# Patient Record
Sex: MALE | Race: WHITE | NOT HISPANIC OR LATINO | Employment: FULL TIME | URBAN - METROPOLITAN AREA
[De-identification: names, ages, dates, MRNs, and addresses within clinical notes are randomized per-mention and may not be internally consistent; named-entity substitution may affect disease eponyms.]

---

## 2019-08-20 ENCOUNTER — OFFICE VISIT (OUTPATIENT)
Dept: FAMILY MEDICINE CLINIC | Facility: CLINIC | Age: 39
End: 2019-08-20
Payer: COMMERCIAL

## 2019-08-20 VITALS
SYSTOLIC BLOOD PRESSURE: 118 MMHG | WEIGHT: 202 LBS | HEIGHT: 70 IN | RESPIRATION RATE: 16 BRPM | OXYGEN SATURATION: 97 % | TEMPERATURE: 98.1 F | DIASTOLIC BLOOD PRESSURE: 70 MMHG | HEART RATE: 65 BPM | BODY MASS INDEX: 28.92 KG/M2

## 2019-08-20 DIAGNOSIS — Z00.00 PHYSICAL EXAM, ANNUAL: Primary | ICD-10-CM

## 2019-08-20 DIAGNOSIS — Z13.220 SCREENING FOR LIPOID DISORDERS: ICD-10-CM

## 2019-08-20 DIAGNOSIS — Z83.71 FAMILY HISTORY OF POLYPS IN THE COLON: ICD-10-CM

## 2019-08-20 DIAGNOSIS — F41.1 GAD (GENERALIZED ANXIETY DISORDER): ICD-10-CM

## 2019-08-20 PROCEDURE — 99385 PREV VISIT NEW AGE 18-39: CPT | Performed by: FAMILY MEDICINE

## 2019-08-20 RX ORDER — DOXYCYCLINE HYCLATE 150 MG/1
TABLET ORAL
COMMUNITY
Start: 2019-07-19 | End: 2020-03-20 | Stop reason: SDUPTHER

## 2019-08-20 RX ORDER — IVERMECTIN 10 MG/G
1 CREAM TOPICAL DAILY PRN
COMMUNITY
End: 2022-05-09

## 2019-08-20 RX ORDER — FLUOXETINE 10 MG/1
TABLET, FILM COATED ORAL
Qty: 60 TABLET | Refills: 3 | Status: SHIPPED | OUTPATIENT
Start: 2019-08-20 | End: 2019-10-04

## 2019-08-20 NOTE — PROGRESS NOTES
Assessment/Plan:    No problem-specific Assessment & Plan notes found for this encounter  Diagnoses and all orders for this visit:    Physical exam, annual  Comments:  work on diet/exercise  labs as ordered  otherwise up to date preventively    LUCRETIA (generalized anxiety disorder)  Comments:  discussed tx options  will start prozac 10mg and uptitrate to 20mg daily  f/u 6 weeks  Orders:  -     Comprehensive metabolic panel; Future  -     CBC and differential; Future  -     TSH, 3rd generation; Future  -     FLUoxetine (PROzac) 10 MG tablet; 1 tab daily x 2 weeks, then increase to 2 tabs daily    Family history of polyps in the colon  -     Ambulatory referral to Colorectal Surgery; Future    Screening for lipoid disorders  -     Lipid panel; Future    Other orders  -     Doxycycline Hyclate 150 MG TABS  -     Ivermectin (SOOLANTRA) 1 % CREA; Apply topically        Subjective:      Patient ID: Marifer Shipman is a 44 y o  male  HPI  Pt presents as new pt  Due for cholesterol testing  Up to date on tdap  Hasn't been exercising   +family hx of colon polyps in his brother <40  Pt has never had colonoscopy  Pt and wife are concerned about anxiety  +difficulty with calming down  +worry  +irritability  No depression  No si/hi  Has been anxious since childhood but never had a work-up or treatment  No impulsivity  +family hx of anxiety  No periods of kavya/hypomania  The following portions of the patient's history were reviewed and updated as appropriate: allergies, current medications, past family history, past medical history, past social history, past surgical history and problem list     Review of Systems   Constitutional: Negative for chills, fatigue, fever and unexpected weight change  HENT: Negative for congestion, ear pain, hearing loss, postnasal drip, rhinorrhea, sinus pressure, sinus pain, sore throat, trouble swallowing and voice change      Eyes: Negative for pain, redness and visual disturbance  Respiratory: Negative for cough and shortness of breath  Cardiovascular: Negative for chest pain, palpitations and leg swelling  Gastrointestinal: Negative for abdominal pain, constipation, diarrhea and nausea  Endocrine: Negative for cold intolerance, heat intolerance, polydipsia and polyuria  Genitourinary: Negative for dysuria, frequency and urgency  Musculoskeletal: Negative for arthralgias, joint swelling and myalgias  Skin: Negative for rash  No suspicious lesions   Neurological: Negative for weakness, numbness and headaches  Hematological: Negative for adenopathy  Objective:      /70   Pulse 65   Temp 98 1 °F (36 7 °C)   Resp 16   Ht 5' 9 57" (1 767 m)   Wt 91 6 kg (202 lb)   SpO2 97%   BMI 29 35 kg/m²          Physical Exam   Constitutional: He is oriented to person, place, and time  He appears well-developed and well-nourished  No distress  HENT:   Head: Normocephalic and atraumatic  Right Ear: Tympanic membrane, external ear and ear canal normal    Left Ear: Tympanic membrane, external ear and ear canal normal    Nose: Nose normal    Mouth/Throat: Oropharynx is clear and moist and mucous membranes are normal  No oropharyngeal exudate  Eyes: Pupils are equal, round, and reactive to light  Conjunctivae and EOM are normal    Neck: No JVD present  Carotid bruit is not present  No thyromegaly present  Cardiovascular: Regular rhythm, S1 normal and S2 normal  Exam reveals no gallop, no S3, no S4 and no friction rub  No murmur heard  Pulmonary/Chest: Effort normal and breath sounds normal  He has no wheezes  He has no rhonchi  He has no rales  Abdominal: Soft  Bowel sounds are normal  He exhibits no distension  There is no tenderness  Lymphadenopathy:     He has no cervical adenopathy  Neurological: He is alert and oriented to person, place, and time  He has normal strength and normal reflexes  No cranial nerve deficit or sensory deficit  Psychiatric: His behavior is normal  Judgment and thought content normal  His mood appears anxious  His speech is rapid and/or pressured  He is not actively hallucinating  Cognition and memory are normal  He is attentive  BMI Counseling: Body mass index is 29 35 kg/m²  Discussed the patient's BMI with him  The BMI is above average  BMI counseling and education was provided to the patient  Exercise recommendations include moderate aerobic physical activity for 150 minutes/week

## 2019-09-20 ENCOUNTER — APPOINTMENT (OUTPATIENT)
Dept: LAB | Facility: CLINIC | Age: 39
End: 2019-09-20
Payer: COMMERCIAL

## 2019-09-20 ENCOUNTER — TRANSCRIBE ORDERS (OUTPATIENT)
Dept: LAB | Facility: CLINIC | Age: 39
End: 2019-09-20

## 2019-09-20 DIAGNOSIS — F41.1 GAD (GENERALIZED ANXIETY DISORDER): ICD-10-CM

## 2019-09-20 DIAGNOSIS — Z13.220 SCREENING FOR LIPOID DISORDERS: ICD-10-CM

## 2019-09-20 LAB
ALBUMIN SERPL BCP-MCNC: 4.1 G/DL (ref 3.5–5)
ALP SERPL-CCNC: 57 U/L (ref 46–116)
ALT SERPL W P-5'-P-CCNC: 27 U/L (ref 12–78)
ANION GAP SERPL CALCULATED.3IONS-SCNC: 6 MMOL/L (ref 4–13)
AST SERPL W P-5'-P-CCNC: 18 U/L (ref 5–45)
BASOPHILS # BLD AUTO: 0.04 THOUSANDS/ΜL (ref 0–0.1)
BASOPHILS NFR BLD AUTO: 1 % (ref 0–1)
BILIRUB SERPL-MCNC: 0.4 MG/DL (ref 0.2–1)
BUN SERPL-MCNC: 6 MG/DL (ref 5–25)
CALCIUM SERPL-MCNC: 8.8 MG/DL (ref 8.3–10.1)
CHLORIDE SERPL-SCNC: 98 MMOL/L (ref 100–108)
CHOLEST SERPL-MCNC: 229 MG/DL (ref 50–200)
CO2 SERPL-SCNC: 29 MMOL/L (ref 21–32)
CREAT SERPL-MCNC: 0.8 MG/DL (ref 0.6–1.3)
EOSINOPHIL # BLD AUTO: 0.12 THOUSAND/ΜL (ref 0–0.61)
EOSINOPHIL NFR BLD AUTO: 2 % (ref 0–6)
ERYTHROCYTE [DISTWIDTH] IN BLOOD BY AUTOMATED COUNT: 12.1 % (ref 11.6–15.1)
GFR SERPL CREATININE-BSD FRML MDRD: 113 ML/MIN/1.73SQ M
GLUCOSE P FAST SERPL-MCNC: 99 MG/DL (ref 65–99)
HCT VFR BLD AUTO: 47.1 % (ref 36.5–49.3)
HDLC SERPL-MCNC: 58 MG/DL (ref 40–60)
HGB BLD-MCNC: 16.2 G/DL (ref 12–17)
IMM GRANULOCYTES # BLD AUTO: 0.02 THOUSAND/UL (ref 0–0.2)
IMM GRANULOCYTES NFR BLD AUTO: 0 % (ref 0–2)
LDLC SERPL CALC-MCNC: 155 MG/DL (ref 0–100)
LYMPHOCYTES # BLD AUTO: 1.03 THOUSANDS/ΜL (ref 0.6–4.47)
LYMPHOCYTES NFR BLD AUTO: 20 % (ref 14–44)
MCH RBC QN AUTO: 31.5 PG (ref 26.8–34.3)
MCHC RBC AUTO-ENTMCNC: 34.4 G/DL (ref 31.4–37.4)
MCV RBC AUTO: 92 FL (ref 82–98)
MONOCYTES # BLD AUTO: 0.65 THOUSAND/ΜL (ref 0.17–1.22)
MONOCYTES NFR BLD AUTO: 13 % (ref 4–12)
NEUTROPHILS # BLD AUTO: 3.3 THOUSANDS/ΜL (ref 1.85–7.62)
NEUTS SEG NFR BLD AUTO: 64 % (ref 43–75)
NONHDLC SERPL-MCNC: 171 MG/DL
NRBC BLD AUTO-RTO: 0 /100 WBCS
PLATELET # BLD AUTO: 241 THOUSANDS/UL (ref 149–390)
PMV BLD AUTO: 8.7 FL (ref 8.9–12.7)
POTASSIUM SERPL-SCNC: 4.2 MMOL/L (ref 3.5–5.3)
PROT SERPL-MCNC: 7.7 G/DL (ref 6.4–8.2)
RBC # BLD AUTO: 5.15 MILLION/UL (ref 3.88–5.62)
SODIUM SERPL-SCNC: 133 MMOL/L (ref 136–145)
TRIGL SERPL-MCNC: 78 MG/DL
TSH SERPL DL<=0.05 MIU/L-ACNC: 1.62 UIU/ML (ref 0.36–3.74)
WBC # BLD AUTO: 5.16 THOUSAND/UL (ref 4.31–10.16)

## 2019-09-20 PROCEDURE — 80053 COMPREHEN METABOLIC PANEL: CPT

## 2019-09-20 PROCEDURE — 80061 LIPID PANEL: CPT

## 2019-09-20 PROCEDURE — 36415 COLL VENOUS BLD VENIPUNCTURE: CPT

## 2019-09-20 PROCEDURE — 84443 ASSAY THYROID STIM HORMONE: CPT

## 2019-09-20 PROCEDURE — 85025 COMPLETE CBC W/AUTO DIFF WBC: CPT

## 2019-10-04 ENCOUNTER — OFFICE VISIT (OUTPATIENT)
Dept: FAMILY MEDICINE CLINIC | Facility: CLINIC | Age: 39
End: 2019-10-04
Payer: COMMERCIAL

## 2019-10-04 ENCOUNTER — APPOINTMENT (OUTPATIENT)
Dept: LAB | Facility: CLINIC | Age: 39
End: 2019-10-04
Payer: COMMERCIAL

## 2019-10-04 VITALS
WEIGHT: 201 LBS | HEIGHT: 70 IN | RESPIRATION RATE: 16 BRPM | OXYGEN SATURATION: 98 % | HEART RATE: 75 BPM | TEMPERATURE: 98.4 F | SYSTOLIC BLOOD PRESSURE: 120 MMHG | DIASTOLIC BLOOD PRESSURE: 78 MMHG | BODY MASS INDEX: 28.77 KG/M2

## 2019-10-04 DIAGNOSIS — E87.1 HYPONATREMIA: ICD-10-CM

## 2019-10-04 DIAGNOSIS — F41.1 GENERALIZED ANXIETY DISORDER: Primary | ICD-10-CM

## 2019-10-04 DIAGNOSIS — R06.83 SNORING: ICD-10-CM

## 2019-10-04 DIAGNOSIS — Z23 FLU VACCINE NEED: ICD-10-CM

## 2019-10-04 DIAGNOSIS — E78.2 MIXED HYPERLIPIDEMIA: ICD-10-CM

## 2019-10-04 DIAGNOSIS — E87.1 HYPONATREMIA: Primary | ICD-10-CM

## 2019-10-04 LAB
ANION GAP SERPL CALCULATED.3IONS-SCNC: 8 MMOL/L (ref 4–13)
BUN SERPL-MCNC: 6 MG/DL (ref 5–25)
CALCIUM SERPL-MCNC: 9 MG/DL (ref 8.3–10.1)
CHLORIDE SERPL-SCNC: 95 MMOL/L (ref 100–108)
CO2 SERPL-SCNC: 28 MMOL/L (ref 21–32)
CREAT SERPL-MCNC: 0.82 MG/DL (ref 0.6–1.3)
GFR SERPL CREATININE-BSD FRML MDRD: 111 ML/MIN/1.73SQ M
GLUCOSE SERPL-MCNC: 94 MG/DL (ref 65–140)
POTASSIUM SERPL-SCNC: 4.1 MMOL/L (ref 3.5–5.3)
SODIUM SERPL-SCNC: 131 MMOL/L (ref 136–145)

## 2019-10-04 PROCEDURE — 90471 IMMUNIZATION ADMIN: CPT

## 2019-10-04 PROCEDURE — 90686 IIV4 VACC NO PRSV 0.5 ML IM: CPT

## 2019-10-04 PROCEDURE — 80048 BASIC METABOLIC PNL TOTAL CA: CPT

## 2019-10-04 PROCEDURE — 99214 OFFICE O/P EST MOD 30 MIN: CPT | Performed by: FAMILY MEDICINE

## 2019-10-04 PROCEDURE — 36415 COLL VENOUS BLD VENIPUNCTURE: CPT

## 2019-10-04 RX ORDER — ESCITALOPRAM OXALATE 10 MG/1
10 TABLET ORAL DAILY
Qty: 30 TABLET | Refills: 3 | Status: SHIPPED | OUTPATIENT
Start: 2019-10-04 | End: 2019-12-22 | Stop reason: SDUPTHER

## 2019-10-04 NOTE — PROGRESS NOTES
Assessment/Plan:    No problem-specific Assessment & Plan notes found for this encounter  Diagnoses and all orders for this visit:    Generalized anxiety disorder  Comments:  having more sleepiness with prozac, though it has been helpful for anxiety  will d/c prozac and start lexapro 10mg daily  f/u 1 mo  Orders:  -     escitalopram (LEXAPRO) 10 mg tablet; Take 1 tablet (10 mg total) by mouth daily    Snoring  Comments:  check home sleep study to r/o DUTCH  Orders:  -     Home Study; Future    Hyponatremia  Comments:  meds vs lab error vs hydration vs other  recheck  Orders:  -     Basic metabolic panel; Future    Mixed hyperlipidemia  Comments:  10 yr cv risk 1%  discussed need for regular exercise and weight loss      Flu vaccine need  -     influenza vaccine, 3138-6118, quadrivalent, 0 5 mL, preservative-free, for adult and pediatric patients 6 mos+ (AFLURIA, FLUARIX, FLULAVAL, FLUZONE)        Subjective:      Patient ID: Tawnya Roldan is a 44 y o  male  HPI  Pt presents in routine f/u of labs and mood  Pt states he has been markedly less irritable and stressed on the prozac  He still has times where the worry and irritability sneak through, but it is less than before  Denies depression, sadness, hopelessness  He has noted, however, that he is extremely tired on the prozac which he takes at night  Worsened at the increase from 10 to 20mg  No si/hi  Can divert his worry and attention easier than before med  Wife has noticed apneic episodes and snoring from   He feels tired daily  Not falling asleep driving or with work      Most recent labs show:  Lab Results   Component Value Date    SODIUM 131 (L) 10/04/2019    K 4 1 10/04/2019    CL 95 (L) 10/04/2019    CO2 28 10/04/2019    AGAP 8 10/04/2019    BUN 6 10/04/2019    CREATININE 0 82 10/04/2019    GLUC 94 10/04/2019    GLUF 99 09/20/2019    CALCIUM 9 0 10/04/2019    AST 18 09/20/2019    ALT 27 09/20/2019    ALKPHOS 57 09/20/2019    TP 7 7 09/20/2019    TBILI 0 40 09/20/2019    EGFR 111 10/04/2019     Lab Results   Component Value Date    CHOLESTEROL 229 (H) 09/20/2019     Lab Results   Component Value Date    HDL 58 09/20/2019     Lab Results   Component Value Date    TRIG 78 09/20/2019     Lab Results   Component Value Date    [place] 171 09/20/2019     Lab Results   Component Value Date    WBC 5 16 09/20/2019    HGB 16 2 09/20/2019    HCT 47 1 09/20/2019    MCV 92 09/20/2019     09/20/2019     Lab Results   Component Value Date    UKX9BXVAMKZT 1 621 09/20/2019       Med/surg/fam hx and meds reviewed  Review of Systems   Constitutional: Positive for fatigue  Negative for chills, fever and unexpected weight change  HENT: Negative for congestion, ear pain, hearing loss, postnasal drip, rhinorrhea, sinus pressure, sinus pain, sore throat, trouble swallowing and voice change  Eyes: Negative for pain, redness and visual disturbance  Respiratory: Negative for cough and shortness of breath  Cardiovascular: Negative for chest pain, palpitations and leg swelling  Gastrointestinal: Negative for abdominal pain, constipation, diarrhea and nausea  Endocrine: Negative for cold intolerance, heat intolerance, polydipsia and polyuria  Genitourinary: Negative for dysuria, frequency and urgency  Musculoskeletal: Negative for arthralgias, joint swelling and myalgias  Skin: Negative for rash  No suspicious lesions   Neurological: Negative for weakness, numbness and headaches  Hematological: Negative for adenopathy  Psychiatric/Behavioral: Negative for dysphoric mood and suicidal ideas  The patient is nervous/anxious  Objective:      /78   Pulse 75   Temp 98 4 °F (36 9 °C)   Resp 16   Ht 5' 9 57" (1 767 m)   Wt 91 2 kg (201 lb)   SpO2 98%   BMI 29 20 kg/m²          Physical Exam   Constitutional: He is oriented to person, place, and time  He appears well-developed and well-nourished  No distress     HENT: Head: Normocephalic and atraumatic  Right Ear: Tympanic membrane, external ear and ear canal normal    Left Ear: Tympanic membrane, external ear and ear canal normal    Nose: Nose normal    Mouth/Throat: Oropharynx is clear and moist and mucous membranes are normal  No oropharyngeal exudate  Eyes: Pupils are equal, round, and reactive to light  Conjunctivae and EOM are normal    Neck: No JVD present  Carotid bruit is not present  No thyromegaly present  Cardiovascular: Regular rhythm, S1 normal and S2 normal  Exam reveals no gallop, no S3, no S4 and no friction rub  No murmur heard  Pulmonary/Chest: Effort normal and breath sounds normal  He has no wheezes  He has no rhonchi  He has no rales  Abdominal: Soft  Bowel sounds are normal  He exhibits no distension  There is no tenderness  Lymphadenopathy:     He has no cervical adenopathy  Neurological: He is alert and oriented to person, place, and time  He has normal strength and normal reflexes  No cranial nerve deficit or sensory deficit  Psychiatric: He has a normal mood and affect   His behavior is normal  Judgment and thought content normal

## 2019-10-09 PROBLEM — Z83.719 FAMILY HISTORY OF COLONIC POLYPS: Status: ACTIVE | Noted: 2019-10-09

## 2019-10-09 PROBLEM — Z83.71 FAMILY HISTORY OF COLONIC POLYPS: Status: ACTIVE | Noted: 2019-10-09

## 2019-10-17 ENCOUNTER — TELEPHONE (OUTPATIENT)
Dept: SLEEP CENTER | Facility: CLINIC | Age: 39
End: 2019-10-17

## 2019-10-17 NOTE — TELEPHONE ENCOUNTER
----- Message from Bradley Daniels MD sent at 10/16/2019  8:45 PM EDT -----  approved  ----- Message -----  From: Robson Alvarado  Sent: 10/16/2019   8:45 AM EDT  To: Sleep Medicine Brittany Lomeli, #    PLEASE REVIEW FOR APPROVAL OR DENIAL AND WHY

## 2019-10-24 ENCOUNTER — APPOINTMENT (OUTPATIENT)
Dept: LAB | Facility: CLINIC | Age: 39
End: 2019-10-24
Payer: COMMERCIAL

## 2019-10-24 DIAGNOSIS — E87.1 HYPONATREMIA: ICD-10-CM

## 2019-10-24 LAB
ANION GAP SERPL CALCULATED.3IONS-SCNC: 8 MMOL/L (ref 4–13)
BUN SERPL-MCNC: 9 MG/DL (ref 5–25)
CALCIUM SERPL-MCNC: 8.8 MG/DL (ref 8.3–10.1)
CHLORIDE SERPL-SCNC: 98 MMOL/L (ref 100–108)
CO2 SERPL-SCNC: 26 MMOL/L (ref 21–32)
CREAT SERPL-MCNC: 0.73 MG/DL (ref 0.6–1.3)
GFR SERPL CREATININE-BSD FRML MDRD: 117 ML/MIN/1.73SQ M
GLUCOSE SERPL-MCNC: 77 MG/DL (ref 65–140)
POTASSIUM SERPL-SCNC: 4.6 MMOL/L (ref 3.5–5.3)
SODIUM SERPL-SCNC: 132 MMOL/L (ref 136–145)

## 2019-10-24 PROCEDURE — 36415 COLL VENOUS BLD VENIPUNCTURE: CPT

## 2019-10-24 PROCEDURE — 80048 BASIC METABOLIC PNL TOTAL CA: CPT

## 2019-10-28 ENCOUNTER — APPOINTMENT (OUTPATIENT)
Dept: LAB | Facility: CLINIC | Age: 39
End: 2019-10-28
Payer: COMMERCIAL

## 2019-10-28 ENCOUNTER — TRANSCRIBE ORDERS (OUTPATIENT)
Dept: LAB | Facility: CLINIC | Age: 39
End: 2019-10-28

## 2019-10-28 DIAGNOSIS — E87.1 HYPONATREMIA: ICD-10-CM

## 2019-10-28 LAB — OSMOLALITY UR: 555 MMOL/KG

## 2019-10-28 PROCEDURE — 83935 ASSAY OF URINE OSMOLALITY: CPT | Performed by: FAMILY MEDICINE

## 2019-10-28 RX ORDER — FLUOXETINE 10 MG/1
10 TABLET, FILM COATED ORAL DAILY
COMMUNITY
Start: 2019-10-24 | End: 2019-11-01

## 2019-11-01 ENCOUNTER — OFFICE VISIT (OUTPATIENT)
Dept: FAMILY MEDICINE CLINIC | Facility: CLINIC | Age: 39
End: 2019-11-01
Payer: COMMERCIAL

## 2019-11-01 VITALS
WEIGHT: 199.8 LBS | DIASTOLIC BLOOD PRESSURE: 72 MMHG | SYSTOLIC BLOOD PRESSURE: 114 MMHG | RESPIRATION RATE: 16 BRPM | TEMPERATURE: 97.5 F | HEIGHT: 69 IN | BODY MASS INDEX: 29.59 KG/M2 | OXYGEN SATURATION: 98 % | HEART RATE: 67 BPM

## 2019-11-01 DIAGNOSIS — E22.2 SIADH (SYNDROME OF INAPPROPRIATE ADH PRODUCTION) (HCC): ICD-10-CM

## 2019-11-01 DIAGNOSIS — F41.1 GENERALIZED ANXIETY DISORDER: Primary | ICD-10-CM

## 2019-11-01 PROCEDURE — 99214 OFFICE O/P EST MOD 30 MIN: CPT | Performed by: FAMILY MEDICINE

## 2019-11-01 PROCEDURE — 3008F BODY MASS INDEX DOCD: CPT | Performed by: FAMILY MEDICINE

## 2019-11-01 RX ORDER — BUPROPION HYDROCHLORIDE 150 MG/1
150 TABLET ORAL EVERY MORNING
Qty: 30 TABLET | Refills: 1 | Status: SHIPPED | OUTPATIENT
Start: 2019-11-01 | End: 2019-12-22 | Stop reason: SDUPTHER

## 2019-11-01 NOTE — PROGRESS NOTES
Assessment/Plan:    No problem-specific Assessment & Plan notes found for this encounter  Diagnoses and all orders for this visit:    Generalized anxiety disorder  Comments:  anxiety is gone, but he has a lot of irritability  we discussed increasing lexapro, but i am hesitant due to the siadh  we discussed adjuncts of wellbutrin, buspar, and low-dose atypical antipsychotics  For now, we will add wellbutrin XL 150mg and watch irritability symptoms--if these increase, will d/c the wellbutrin and make another plan    Orders:  -     buPROPion (WELLBUTRIN XL) 150 mg 24 hr tablet; Take 1 tablet (150 mg total) by mouth every morning  -     Basic metabolic panel; Future    SIADH (syndrome of inappropriate ADH production) ([facility])  Comments:  urine osms/exam/labs c/w siadh as cause of hyponatremia  this is likely due to ssri's, but these have been extremely helpful to him, so I am loathe to stop them  Will continue lexapro as hyponatremia is mild  Adding wellbutrin  Check bmp in 2 weeks      Other orders  -     Discontinue: FLUoxetine (PROzac) 10 MG tablet; Take 10 mg by mouth daily        Subjective:      Patient ID: Elda Zheng is a 44 y o  male  HPI    Pt presents in routine f/u for anxiety  He is far less anxious  Notices less worry  Sleeping well  His only issue is irritability which seems excessive to him and his wife  He isn't sedated as he was on prozac  No si/hi  Feels stressed but doesn't feel sad or hopeless  Trying to sleep and eat regularly  The following portions of the patient's history were reviewed and updated as appropriate: allergies, current medications, past family history, past medical history, past social history, past surgical history and problem list     Review of Systems   Constitutional: Negative for chills, fatigue, fever and unexpected weight change     HENT: Negative for congestion, ear pain, hearing loss, postnasal drip, rhinorrhea, sinus pressure, sinus pain, sore throat, trouble swallowing and voice change  Eyes: Negative for pain, redness and visual disturbance  Respiratory: Negative for cough and shortness of breath  Cardiovascular: Negative for chest pain, palpitations and leg swelling  Gastrointestinal: Negative for abdominal pain, constipation, diarrhea and nausea  Endocrine: Negative for cold intolerance, heat intolerance, polydipsia and polyuria  Genitourinary: Negative for dysuria, frequency and urgency  Musculoskeletal: Negative for arthralgias, joint swelling and myalgias  Skin: Negative for rash  No suspicious lesions   Neurological: Negative for weakness, numbness and headaches  Hematological: Negative for adenopathy  Psychiatric/Behavioral: Negative for suicidal ideas  The patient is nervous/anxious  Objective:      /72   Pulse 67   Temp 97 5 °F (36 4 °C)   Resp 16   Ht 5' 9" (1 753 m)   Wt 90 6 kg (199 lb 12 8 oz)   SpO2 98%   BMI 29 51 kg/m²          Physical Exam   Constitutional: He is oriented to person, place, and time  He appears well-developed and well-nourished  No distress  HENT:   Head: Normocephalic and atraumatic  Right Ear: Tympanic membrane, external ear and ear canal normal    Left Ear: Tympanic membrane, external ear and ear canal normal    Nose: Mucosal edema present  Mouth/Throat: Mucous membranes are normal  Posterior oropharyngeal edema and posterior oropharyngeal erythema present  No oropharyngeal exudate  Eyes: Pupils are equal, round, and reactive to light  Conjunctivae and EOM are normal    Neck: No JVD present  Carotid bruit is not present  No thyromegaly present  Cardiovascular: Regular rhythm, S1 normal and S2 normal  Exam reveals no gallop, no S3, no S4 and no friction rub  No murmur heard  Pulmonary/Chest: Effort normal and breath sounds normal  He has no wheezes  He has no rhonchi  He has no rales  Abdominal: Soft  Bowel sounds are normal  He exhibits no distension   There is no tenderness  Lymphadenopathy:     He has no cervical adenopathy  Neurological: He is alert and oriented to person, place, and time  He has normal strength and normal reflexes  No cranial nerve deficit or sensory deficit  Psychiatric: He has a normal mood and affect  His behavior is normal  Judgment and thought content normal  His speech is rapid and/or pressured  He is not actively hallucinating  Cognition and memory are normal  He is attentive  BMI Counseling: Body mass index is 29 51 kg/m²  The BMI is above normal  Nutrition recommendations include reducing portion sizes  Exercise recommendations include exercising 3-5 times per week

## 2019-11-18 ENCOUNTER — APPOINTMENT (OUTPATIENT)
Dept: LAB | Facility: CLINIC | Age: 39
End: 2019-11-18
Payer: COMMERCIAL

## 2019-11-18 DIAGNOSIS — F41.1 GENERALIZED ANXIETY DISORDER: ICD-10-CM

## 2019-11-18 LAB
ANION GAP SERPL CALCULATED.3IONS-SCNC: 11 MMOL/L (ref 4–13)
BUN SERPL-MCNC: 10 MG/DL (ref 5–25)
CALCIUM SERPL-MCNC: 8.5 MG/DL (ref 8.3–10.1)
CHLORIDE SERPL-SCNC: 101 MMOL/L (ref 100–108)
CO2 SERPL-SCNC: 26 MMOL/L (ref 21–32)
CREAT SERPL-MCNC: 0.7 MG/DL (ref 0.6–1.3)
GFR SERPL CREATININE-BSD FRML MDRD: 119 ML/MIN/1.73SQ M
GLUCOSE SERPL-MCNC: 94 MG/DL (ref 65–140)
POTASSIUM SERPL-SCNC: 4.1 MMOL/L (ref 3.5–5.3)
SODIUM SERPL-SCNC: 138 MMOL/L (ref 136–145)

## 2019-11-18 PROCEDURE — 80048 BASIC METABOLIC PNL TOTAL CA: CPT

## 2019-11-18 PROCEDURE — 36415 COLL VENOUS BLD VENIPUNCTURE: CPT

## 2019-11-20 ENCOUNTER — HOSPITAL ENCOUNTER (OUTPATIENT)
Dept: SLEEP CENTER | Facility: CLINIC | Age: 39
Discharge: HOME | End: 2019-11-20
Payer: COMMERCIAL

## 2019-11-20 DIAGNOSIS — R06.83 SNORING: ICD-10-CM

## 2019-11-20 PROCEDURE — G0399 HOME SLEEP TEST/TYPE 3 PORTA: HCPCS

## 2019-11-20 PROCEDURE — G0399 HOME SLEEP TEST/TYPE 3 PORTA: HCPCS | Performed by: PSYCHIATRY & NEUROLOGY

## 2019-11-21 NOTE — PROGRESS NOTES
Home Sleep Study Documentation    Pre-Sleep Home Study:    Set-up and instructions performed by: Dexter Beckford    Technician performed demonstration for Patient: yes    Return demonstration performed by Patient: yes    Written instructions provided to Patient: yes    Patient signed consent form: yes        Post-Sleep Home Study:    Additional comments by Patient: none    Home Sleep Study Failed:no:    Failure reason: N/A    Reported or Detected: N/A    Scored by: Yamilka Watts

## 2019-11-26 ENCOUNTER — TELEPHONE (OUTPATIENT)
Dept: SLEEP CENTER | Facility: CLINIC | Age: 39
End: 2019-11-26

## 2019-11-26 NOTE — TELEPHONE ENCOUNTER
I spoke with patient, advised above  Patient states he worked for [employer] and does not think he would be able to use CPAP  Scheduled consult with Dr Zachary Marcelo to review other treatment options  1/16/20 at 1100 in [place]

## 2019-11-26 NOTE — TELEPHONE ENCOUNTER
----- Message from Hector Baker MD sent at 11/25/2019  4:36 PM EST -----  HST read  Either APAP or CPAP titration recommended

## 2019-11-29 ENCOUNTER — TELEPHONE (OUTPATIENT)
Dept: SLEEP CENTER | Facility: CLINIC | Age: 39
End: 2019-11-29

## 2019-11-29 NOTE — TELEPHONE ENCOUNTER
Returned the [employer] office call  They wanted to know how to order CPAP study  Patient has not had consult with Dr Kyra Zavala yet it is scheduled for 1/16/2020

## 2019-12-06 ENCOUNTER — OFFICE VISIT (OUTPATIENT)
Dept: FAMILY MEDICINE CLINIC | Facility: CLINIC | Age: 39
End: 2019-12-06
Payer: COMMERCIAL

## 2019-12-06 VITALS
SYSTOLIC BLOOD PRESSURE: 116 MMHG | HEIGHT: 69 IN | DIASTOLIC BLOOD PRESSURE: 68 MMHG | RESPIRATION RATE: 12 BRPM | OXYGEN SATURATION: 98 % | HEART RATE: 99 BPM | TEMPERATURE: 97.5 F | WEIGHT: 204.8 LBS | BODY MASS INDEX: 30.33 KG/M2

## 2019-12-06 DIAGNOSIS — F41.1 GENERALIZED ANXIETY DISORDER: Primary | ICD-10-CM

## 2019-12-06 DIAGNOSIS — G47.33 OSA (OBSTRUCTIVE SLEEP APNEA): ICD-10-CM

## 2019-12-06 DIAGNOSIS — E22.2 SIADH (SYNDROME OF INAPPROPRIATE ADH PRODUCTION) (HCC): ICD-10-CM

## 2019-12-06 PROCEDURE — 3008F BODY MASS INDEX DOCD: CPT | Performed by: FAMILY MEDICINE

## 2019-12-06 PROCEDURE — 1036F TOBACCO NON-USER: CPT | Performed by: FAMILY MEDICINE

## 2019-12-06 PROCEDURE — 99213 OFFICE O/P EST LOW 20 MIN: CPT | Performed by: FAMILY MEDICINE

## 2019-12-06 NOTE — PROGRESS NOTES
Assessment/Plan:    No problem-specific Assessment & Plan notes found for this encounter  Diagnoses and all orders for this visit:    Generalized anxiety disorder  Comments:  improved on current meds  continue same  re-eval once his sleep is sorted with cpap  f/u 2 mo after cpap start    DUTCH (obstructive sleep apnea)  Comments:  f/u with sleep doc  severe sleep apnea--suspect he might do better with a titration than with autopap, but will defer to sleep med    SIADH (syndrome of inappropriate ADH production) ([facility] [address] )  Comments:  last sodium nml  continue current meds  likely due to ssri        Subjective:      Patient ID: Malia Parent is a 44 y o  male  HPI  Pt presents in f/u for mood  Since starting wellbutrin, he feels much better  Less irritable  Doesn't feel anxious  Isn't getting as worked up as he used to  No depression/si/hi  Last sodium normalized after having been slightly low, presumably due to ssri    Pt recently had at-home sleep study  +severe apnea  Has upcoming appt with sleep medicine    Lab Results   Component Value Date    SODIUM 138 11/18/2019    K 4 1 11/18/2019     11/18/2019    CO2 26 11/18/2019    BUN 10 11/18/2019    CREATININE 0 70 11/18/2019    GLUC 94 11/18/2019    CALCIUM 8 5 11/18/2019       The following portions of the patient's history were reviewed and updated as appropriate: allergies, current medications, past family history, past medical history, past social history, past surgical history and problem list     Review of Systems     see HPI    Objective:      /68   Pulse 99   Temp 97 5 °F (36 4 °C)   Resp 12   Ht 5' 9" (1 753 m)   Wt 92 9 kg (204 lb 12 8 oz)   SpO2 98%   BMI 30 24 kg/m²          Physical Exam   Constitutional: He appears well-developed and well-nourished  HENT:   Head: Normocephalic and atraumatic  Eyes: Pupils are equal, round, and reactive to light  Conjunctivae are normal    Neck: Normal range of motion  Cardiovascular: Normal rate and regular rhythm  Pulmonary/Chest: Effort normal and breath sounds normal    Psychiatric: He has a normal mood and affect   His behavior is normal  Judgment and thought content normal

## 2019-12-09 ENCOUNTER — TELEPHONE (OUTPATIENT)
Dept: SLEEP CENTER | Facility: CLINIC | Age: 39
End: 2019-12-09

## 2019-12-13 ENCOUNTER — LAB REQUISITION (OUTPATIENT)
Dept: LAB | Facility: HOSPITAL | Age: 39
End: 2019-12-13
Payer: COMMERCIAL

## 2019-12-13 DIAGNOSIS — Z80.0 FAMILY HISTORY OF MALIGNANT NEOPLASM OF DIGESTIVE ORGANS: ICD-10-CM

## 2019-12-13 DIAGNOSIS — D12.3 BENIGN NEOPLASM OF TRANSVERSE COLON: ICD-10-CM

## 2019-12-13 PROCEDURE — 88305 TISSUE EXAM BY PATHOLOGIST: CPT | Performed by: PATHOLOGY

## 2019-12-22 DIAGNOSIS — F41.1 GENERALIZED ANXIETY DISORDER: ICD-10-CM

## 2019-12-22 RX ORDER — BUPROPION HYDROCHLORIDE 150 MG/1
TABLET ORAL
Qty: 30 TABLET | Refills: 1 | Status: SHIPPED | OUTPATIENT
Start: 2019-12-22 | End: 2019-12-26 | Stop reason: SDUPTHER

## 2019-12-22 RX ORDER — ESCITALOPRAM OXALATE 10 MG/1
TABLET ORAL
Qty: 30 TABLET | Refills: 3 | Status: SHIPPED | OUTPATIENT
Start: 2019-12-22 | End: 2020-02-29 | Stop reason: SDUPTHER

## 2019-12-26 DIAGNOSIS — F41.1 GENERALIZED ANXIETY DISORDER: ICD-10-CM

## 2019-12-26 RX ORDER — BUPROPION HYDROCHLORIDE 150 MG/1
TABLET ORAL
Qty: 30 TABLET | Refills: 1 | Status: SHIPPED | OUTPATIENT
Start: 2019-12-26 | End: 2020-02-24 | Stop reason: SDUPTHER

## 2020-01-15 DIAGNOSIS — J11.1 INFLUENZA: Primary | ICD-10-CM

## 2020-01-15 DIAGNOSIS — Z20.828 EXPOSURE TO INFLUENZA: ICD-10-CM

## 2020-01-15 RX ORDER — OSELTAMIVIR PHOSPHATE 75 MG/1
75 CAPSULE ORAL DAILY
Qty: 10 CAPSULE | Refills: 0 | Status: SHIPPED | OUTPATIENT
Start: 2020-01-15 | End: 2020-01-25

## 2020-01-16 ENCOUNTER — OFFICE VISIT (OUTPATIENT)
Dept: SLEEP CENTER | Facility: CLINIC | Age: 40
End: 2020-01-16
Payer: COMMERCIAL

## 2020-01-16 VITALS
HEIGHT: 69 IN | DIASTOLIC BLOOD PRESSURE: 74 MMHG | BODY MASS INDEX: 31.6 KG/M2 | WEIGHT: 213.38 LBS | SYSTOLIC BLOOD PRESSURE: 110 MMHG

## 2020-01-16 DIAGNOSIS — G47.33 OSA (OBSTRUCTIVE SLEEP APNEA): Primary | ICD-10-CM

## 2020-01-16 PROCEDURE — 3008F BODY MASS INDEX DOCD: CPT | Performed by: PSYCHIATRY & NEUROLOGY

## 2020-01-16 PROCEDURE — 99204 OFFICE O/P NEW MOD 45 MIN: CPT | Performed by: PSYCHIATRY & NEUROLOGY

## 2020-01-16 NOTE — LETTER
January 16, 2020     Nan Padgett DO  [address]  [address]  [place]    Patient: Ezra Mercado   YOB: 1980   Date of Visit: 1/16/2020       Dear Dr Ute Jefferson: Thank you for referring Ezra Mercado to me for evaluation  Below are my notes for this consultation  If you have questions, please do not hesitate to call me  I look forward to following your patient along with you  Sincerely,        Yvonne Rice MD        CC: No Recipients  Yvonne Rice MD  1/16/2020  1:08 PM  Sign at close encounter  Sleep Medicine Consultation Note    HPI:  Mr Ezra Mercado is a 44 y o  male seen at the request of Nan Padgett DO for advice regarding suspected sleep disordered breathing  The had a HST done in 11/2019 and had an PREETI of 28 and a kenneth of 79%  The patient stated that his wife noticed him stopping breathing at night and gasping for air  She stated to notice this in August, but he has been snoring for about 30 years  She has been noticing it more  She is not sleeping well now because she is worried about him  He tried not drinking alcohol at night to see if this was the cause, but this was not helpful  He wakes up feeling poor, not rested and tired during the day  He sometimes naps during the day  The napping has been happening more lately which he attributed to Prozac  He changed to Celexa which has resolved now       Please see below for continuation of the HPI:      Sleep Disordered Breathing:  -Snoring: yes   -Severity: loudly   -Frequency: every night   -Duration: 30 years    -Over time: worsened   -Modifying factors: worse on his back, better on stomach  -Observed Apneas: yes  -Mouth Breathing at night: yes  -Dry Mouth in morning: yes   -Nocturnal Gasping: yes  -Nasal Obstruction: no  -Weight: 10 lbs increase over the years    Sleep Pattern:  -Location: bedroom  -Bed/Recliner/Wedge: bed  -Bed Partner: yes  -HOB: sometimes elevated (20 degrees) and other times flat  -# of pillows under head: 1  -Position: side   -Bedtime: 10pm  -Lights out: same time  -Environmental: No lights/TV  -Latency: 5 mins  -Awakenings: 1   -Reason: void   -Duration: mins  -Wake time: 7am   -Alarm: yes  -Rise time: same time  -Days off: same  -Shift Work: M-F days  -Patient's estimate of total sleep time: 7-8h    Daytime Symptoms:  -Upon Awakening: tired, not rested  -Daytime fatigue/sleepiness: tired and fatigue most of the days  -Naps: yes  -Involuntary Dozing: if he lays down to rest after lunch  -Cognitive Symptoms: denied  -Driving: Difficulty with sleepiness and driving:  denied   -- Close calls related to sleepiness: denied   -- Accidents related to sleepiness: denied      Questionnaires:  Sitting and reading: Would never doze  Watching TV: Would never doze  Sitting, inactive in a public place (e g  a theatre or a meeting): Would never doze  As a passenger in a car for an hour without a break: Would never doze  Lying down to rest in the afternoon when circumstances permit: Moderate chance of dozing  Sitting and talking to someone: Would never doze  Sitting quietly after a lunch without alcohol:  Moderate chance of dozing  In a car, while stopped for a few minutes in traffic: Would never doze  Total score: 4      Sleep Review of Symptoms:  -Parasomnias:  --Sleep Walking: denied  --Dream Enactment: denied  --Bruxism: yes, has a mouth guard  -Motor:  --RLS: no  --PLMS: no  -Narcolepsy:  --Hallucinations: denied  --Paralysis: denied  --Cataplexy: n/a    Childhood Sleep History: snoring    Prior Sleep Studies/Evaluations:  HST in 11/2019    Family History:  Family history of sleep disorders: some family members snore    Patient Active Problem List   Diagnosis    Mixed hyperlipidemia    Generalized anxiety disorder    Family history of colonic polyps    SIADH (syndrome of inappropriate ADH production) ([facility] [address] )    DUTCH (obstructive sleep apnea)    Snoring     Past Medical History:   Diagnosis Date    Acne     Rosacea          --> Denies any cardiopulmonary disease  --> Seizure hx: denies  --> Head injury with LOC: denies  --> Supplemental Oxygen Use: denies    Labs   Results for Lisa Sahni (MRN [id number]) as of 1/16/2020 11:28   Ref  Range 11/18/2019 08:26   Sodium Latest Ref Range: 136 - 145 mmol/L 138   Potassium Latest Ref Range: 3 5 - 5 3 mmol/L 4 1   Chloride Latest Ref Range: 100 - 108 mmol/L 101   CO2 Latest Ref Range: 21 - 32 mmol/L 26   Anion Gap Latest Ref Range: 4 - 13 mmol/L 11   BUN Latest Ref Range: 5 - 25 mg/dL 10   Creatinine Latest Ref Range: 0 60 - 1 30 mg/dL 0 70   Glucose, Random Latest Ref Range: 65 - 140 mg/dL 94   Calcium Latest Ref Range: 8 3 - 10 1 mg/dL 8 5   eGFR Latest Units: ml/min/1 73sq m 119       History reviewed  No pertinent surgical history  --> ENT procedures: denies    Current Outpatient Medications   Medication Sig Dispense Refill    buPROPion (WELLBUTRIN XL) 150 mg 24 hr tablet TAKE 1 TABLET BY MOUTH EVERY DAY IN THE MORNING 30 tablet 1    Doxycycline Hyclate 150 MG TABS       escitalopram (LEXAPRO) 10 mg tablet TAKE 1 TABLET BY MOUTH EVERY DAY 30 tablet 3    Ivermectin (SOOLANTRA) 1 % CREA Apply topically      oseltamivir (TAMIFLU) 75 mg capsule Take 1 capsule (75 mg total) by mouth daily for 10 days 10 capsule 0     No current facility-administered medications for this visit          Social History:  -Employment: works from home as a   -Smoking: quit 2009  -Caffeine: one cup of coffee in the morning and one coke a day  -Alcohol: 3-4 beers a week  -THC: denied  -OTC/Supplements/herbals: denied  -Illicits:  denies  -Family: lives with family    ROS:  Genitourinary need to urinate more than twice a night   Cardiology none   Gastrointestinal none   Neurology numbness/tingling of an extremity   Constitutional none   Integumentary none   Psychiatry anxiety   Musculoskeletal sciatica   Pulmonary snoring   ENT none   Endocrine none Hematological none       MSE:  -Alert and appropriate: calm, cooperative  -Oriented to person, place and time:  name, age, location, day/date/mon/yr  -Behavior: good, sustained eye contact  -Speech: Unremarkable rate/rhythm/volume  -Mood: "good"  -Affect: full range  -Thought Processes: linear, logical, goal directed  PE:  Body mass index is 31 51 kg/m²  Vitals:    01/16/20 1100   BP: 110/74   Weight: 96 8 kg (213 lb 6 oz)   Height: 5' 9" (1 753 m)       -General:  In NAD    -Eyes: Conjunctival injection: none     -EOM:  PERRLA, EOMI   -Eyelid hooding: yes    -ENT: MP: 4/4   -Facial deformity:  retrognathia   -Hard palate: very high and narrow arch   -Soft palate:  Crowding with redundant tissue   -Gums and teeth: normal dentition   -Tongue:  Scalloping   -Nares:  Patent    -Neck/Lymphatics: Lymphadenopathy:  none appreciated   -Masses:  none appreciated   -Circumference: Neck Circumference: 17 "    -Cardiac: Auscultation:  RRR   - LE edema over shins: none appreciated    -Pulm: -Respirations: unlaboured         -Auscultation:  CTA bilaterally, posterior fields    -Neuro: No resting tremor     -Musculoskeletal: Gait and stance: normal turning and ambulation; unremarkable  Assessment:  Mr Federico Huston is a 44 y o  male who is seen to evaluate for possible obstructive sleep apnea  Recently diagnosed with moderate to severe DUTCH with low saturations  He is symptomatic with snoring, gasping, witnessed apneas, poor sleep quality, daytime symptom, and mood symptoms and would greatly benefit from CPAP  He is amenable to treatment with PAP therapy  Discussed keeping nasal passages clear, abstaining from alcohol, and other sedating drugs at night- which will worsen symptoms of DUTCH  --History provided by: patient   --Records reviewed: in chart      Recommendations:  1) APAP 6-16cm with FFM  Will take the script and will not go through insurance   2) Driving safety was reviewed with patient    If the patient feels too sleepy to drive he knows not to drive  If he becomes sleepy while driving he will pull over and nap  3) Follow-up 6-8 weeks  4) Call with any questions or concerns  All questions answered for the patient, who indicated understanding and agreed with the plan       Ирина Correa MD  Psychiatry/ Sleep medicine

## 2020-01-16 NOTE — PATIENT INSTRUCTIONS
Recommendations:  1) APAP 6-16cm with FFM  Will take the script and will not go through insurance   2) Driving safety was reviewed with patient  If the patient feels too sleepy to drive he knows not to drive  If he becomes sleepy while driving he will pull over and nap  3) Follow-up 6-8 weeks  4) Call with any questions or concerns

## 2020-01-16 NOTE — PROGRESS NOTES
Sleep Medicine Consultation Note    HPI:  Mr Walter Coronel is a 44 y o  male seen at the request of Elijah Washington DO for advice regarding suspected sleep disordered breathing  The had a HST done in 11/2019 and had an PREETI of 28 and a kenneth of 79%  The patient stated that his wife noticed him stopping breathing at night and gasping for air  She stated to notice this in August, but he has been snoring for about 30 years  She has been noticing it more  She is not sleeping well now because she is worried about him  He tried not drinking alcohol at night to see if this was the cause, but this was not helpful  He wakes up feeling poor, not rested and tired during the day  He sometimes naps during the day  The napping has been happening more lately which he attributed to Prozac  He changed to Celexa which has resolved now       Please see below for continuation of the HPI:      Sleep Disordered Breathing:  -Snoring: yes   -Severity: loudly   -Frequency: every night   -Duration: 30 years    -Over time: worsened   -Modifying factors: worse on his back, better on stomach  -Observed Apneas: yes  -Mouth Breathing at night: yes  -Dry Mouth in morning: yes   -Nocturnal Gasping: yes  -Nasal Obstruction: no  -Weight: 10 lbs increase over the years    Sleep Pattern:  -Location: bedroom  -Bed/Recliner/Wedge: bed  -Bed Partner: yes  -HOB: sometimes elevated (20 degrees) and other times flat  -# of pillows under head: 1  -Position: side   -Bedtime: 10pm  -Lights out: same time  -Environmental: No lights/TV  -Latency: 5 mins  -Awakenings: 1   -Reason: void   -Duration: mins  -Wake time: 7am   -Alarm: yes  -Rise time: same time  -Days off: same  -Shift Work: M-F days  -Patient's estimate of total sleep time: 7-8h    Daytime Symptoms:  -Upon Awakening: tired, not rested  -Daytime fatigue/sleepiness: tired and fatigue most of the days  -Naps: yes  -Involuntary Dozing: if he lays down to rest after lunch  -Cognitive Symptoms: denied  -Driving: Difficulty with sleepiness and driving:  denied   -- Close calls related to sleepiness: denied   -- Accidents related to sleepiness: denied      Questionnaires:  Sitting and reading: Would never doze  Watching TV: Would never doze  Sitting, inactive in a public place (e g  a theatre or a meeting): Would never doze  As a passenger in a car for an hour without a break: Would never doze  Lying down to rest in the afternoon when circumstances permit: Moderate chance of dozing  Sitting and talking to someone: Would never doze  Sitting quietly after a lunch without alcohol: Moderate chance of dozing  In a car, while stopped for a few minutes in traffic: Would never doze  Total score: 4      Sleep Review of Symptoms:  -Parasomnias:  --Sleep Walking: denied  --Dream Enactment: denied  --Bruxism: yes, has a mouth guard  -Motor:  --RLS: no  --PLMS: no  -Narcolepsy:  --Hallucinations: denied  --Paralysis: denied  --Cataplexy: n/a    Childhood Sleep History: snoring    Prior Sleep Studies/Evaluations:  HST in 11/2019    Family History:  Family history of sleep disorders: some family members snore    Patient Active Problem List   Diagnosis    Mixed hyperlipidemia    Generalized anxiety disorder    Family history of colonic polyps    SIADH (syndrome of inappropriate ADH production) ([facility] [address] )    DUTCH (obstructive sleep apnea)    Snoring     Past Medical History:   Diagnosis Date    Acne     Rosacea          --> Denies any cardiopulmonary disease  --> Seizure hx: denies  --> Head injury with LOC: denies  --> Supplemental Oxygen Use: denies    Labs   Results for Eliza Thacker (MRN [id number]) as of 1/16/2020 11:28   Ref   Range 11/18/2019 08:26   Sodium Latest Ref Range: 136 - 145 mmol/L 138   Potassium Latest Ref Range: 3 5 - 5 3 mmol/L 4 1   Chloride Latest Ref Range: 100 - 108 mmol/L 101   CO2 Latest Ref Range: 21 - 32 mmol/L 26   Anion Gap Latest Ref Range: 4 - 13 mmol/L 11   BUN Latest Ref Range: 5 - 25 mg/dL 10   Creatinine Latest Ref Range: 0 60 - 1 30 mg/dL 0 70   Glucose, Random Latest Ref Range: 65 - 140 mg/dL 94   Calcium Latest Ref Range: 8 3 - 10 1 mg/dL 8 5   eGFR Latest Units: ml/min/1 73sq m 119       History reviewed  No pertinent surgical history  --> ENT procedures: denies    Current Outpatient Medications   Medication Sig Dispense Refill    buPROPion (WELLBUTRIN XL) 150 mg 24 hr tablet TAKE 1 TABLET BY MOUTH EVERY DAY IN THE MORNING 30 tablet 1    Doxycycline Hyclate 150 MG TABS       escitalopram (LEXAPRO) 10 mg tablet TAKE 1 TABLET BY MOUTH EVERY DAY 30 tablet 3    Ivermectin (SOOLANTRA) 1 % CREA Apply topically      oseltamivir (TAMIFLU) 75 mg capsule Take 1 capsule (75 mg total) by mouth daily for 10 days 10 capsule 0     No current facility-administered medications for this visit  Social History:  -Employment: works from home as a   -Smoking: quit 2009  -Caffeine: one cup of coffee in the morning and one coke a day  -Alcohol: 3-4 beers a week  -THC: denied  -OTC/Supplements/herbals: denied  -Illicits:  denies  -Family: lives with family    ROS:  Genitourinary need to urinate more than twice a night   Cardiology none   Gastrointestinal none   Neurology numbness/tingling of an extremity   Constitutional none   Integumentary none   Psychiatry anxiety   Musculoskeletal sciatica   Pulmonary snoring   ENT none   Endocrine none   Hematological none       MSE:  -Alert and appropriate: calm, cooperative  -Oriented to person, place and time:  name, age, location, day/date/mon/yr  -Behavior: good, sustained eye contact  -Speech: Unremarkable rate/rhythm/volume  -Mood: "good"  -Affect: full range  -Thought Processes: linear, logical, goal directed  PE:  Body mass index is 31 51 kg/m²    Vitals:    01/16/20 1100   BP: 110/74   Weight: 96 8 kg (213 lb 6 oz)   Height: 5' 9" (1 753 m)       -General:  In NAD    -Eyes: Conjunctival injection: none     -EOM:  PERRLA, EOMI   -Eyelid hooding: yes    -ENT: MP: 4/4   -Facial deformity:  retrognathia   -Hard palate: very high and narrow arch   -Soft palate:  Crowding with redundant tissue   -Gums and teeth: normal dentition   -Tongue:  Scalloping   -Nares:  Patent    -Neck/Lymphatics: Lymphadenopathy:  none appreciated   -Masses:  none appreciated   -Circumference: Neck Circumference: 17 "    -Cardiac: Auscultation:  RRR   - LE edema over shins: none appreciated    -Pulm: -Respirations: unlaboured         -Auscultation:  CTA bilaterally, posterior fields    -Neuro: No resting tremor     -Musculoskeletal: Gait and stance: normal turning and ambulation; unremarkable  Assessment:  Mr Magdi Crespo is a 44 y o  male who is seen to evaluate for possible obstructive sleep apnea  Recently diagnosed with moderate to severe DUTCH with low saturations  He is symptomatic with snoring, gasping, witnessed apneas, poor sleep quality, daytime symptom, and mood symptoms and would greatly benefit from CPAP  He is amenable to treatment with PAP therapy  Discussed keeping nasal passages clear, abstaining from alcohol, and other sedating drugs at night- which will worsen symptoms of DUTCH  --History provided by: patient   --Records reviewed: in chart      Recommendations:  1) APAP 6-16cm with FFM  Will take the script and will not go through insurance   2) Driving safety was reviewed with patient  If the patient feels too sleepy to drive he knows not to drive  If he becomes sleepy while driving he will pull over and nap  3) Follow-up 6-8 weeks  4) Call with any questions or concerns  All questions answered for the patient, who indicated understanding and agreed with the plan       Jacek Haas MD  Psychiatry/ Sleep medicine

## 2020-01-20 ENCOUNTER — TELEPHONE (OUTPATIENT)
Dept: SLEEP CENTER | Facility: CLINIC | Age: 40
End: 2020-01-20

## 2020-01-20 NOTE — TELEPHONE ENCOUNTER
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

## 2020-02-12 ENCOUNTER — TELEPHONE (OUTPATIENT)
Dept: FAMILY MEDICINE CLINIC | Facility: CLINIC | Age: 40
End: 2020-02-12

## 2020-02-12 DIAGNOSIS — Z20.828 EXPOSURE TO INFLUENZA: Primary | ICD-10-CM

## 2020-02-12 RX ORDER — OSELTAMIVIR PHOSPHATE 75 MG/1
75 CAPSULE ORAL DAILY
Qty: 10 CAPSULE | Refills: 0 | Status: SHIPPED | OUTPATIENT
Start: 2020-02-12 | End: 2020-02-22

## 2020-02-12 NOTE — TELEPHONE ENCOUNTER
Placed call to patient's wife (okay per communication form) to see if he is having any symptoms at this time  Left a message to return our call

## 2020-02-12 NOTE — TELEPHONE ENCOUNTER
Patient's wife returned call, patient does not have any symptoms currently  Per patients wife, 1 month ago Evan Davies (son), Matilde Sportsman (son) and Kimberly Angela (wife) had flu type B  Patient and daughter were fine  This weekend their family was over who were sick (fever) and tested positive for influenza A on Monday  Mother in law is going through chemotherapy and there is a concern that she will be exposed if patient catches the flu  Requesting tamiflu to be sent into [facility] on [facility] in [place]

## 2020-02-12 NOTE — TELEPHONE ENCOUNTER
Patient's wife called, their children have been diagnosed with Influenza A and he does not want to get it  Would you please call in [facility] for the patient so that he can try to prevent getting this  Please let them know if you would take care of this for them

## 2020-02-13 DIAGNOSIS — F41.1 GENERALIZED ANXIETY DISORDER: ICD-10-CM

## 2020-02-13 RX ORDER — ESCITALOPRAM OXALATE 10 MG/1
TABLET ORAL
Qty: 90 TABLET | Refills: 1 | OUTPATIENT
Start: 2020-02-13

## 2020-02-24 DIAGNOSIS — F41.1 GENERALIZED ANXIETY DISORDER: ICD-10-CM

## 2020-02-24 RX ORDER — BUPROPION HYDROCHLORIDE 150 MG/1
TABLET ORAL
Qty: 30 TABLET | Refills: 1 | Status: SHIPPED | OUTPATIENT
Start: 2020-02-24 | End: 2020-03-19

## 2020-02-29 DIAGNOSIS — F41.1 GENERALIZED ANXIETY DISORDER: ICD-10-CM

## 2020-02-29 RX ORDER — ESCITALOPRAM OXALATE 10 MG/1
TABLET ORAL
Qty: 90 TABLET | Refills: 1 | Status: SHIPPED | OUTPATIENT
Start: 2020-02-29 | End: 2020-11-06

## 2020-03-19 DIAGNOSIS — F41.1 GENERALIZED ANXIETY DISORDER: ICD-10-CM

## 2020-03-19 RX ORDER — BUPROPION HYDROCHLORIDE 150 MG/1
TABLET ORAL
Qty: 30 TABLET | Refills: 1 | Status: SHIPPED | OUTPATIENT
Start: 2020-03-19 | End: 2020-04-15

## 2020-03-20 DIAGNOSIS — L71.9 ROSACEA: Primary | ICD-10-CM

## 2020-03-20 RX ORDER — DOXYCYCLINE HYCLATE 150 MG/1
150 TABLET ORAL AS NEEDED
Qty: 30 TABLET | Refills: 0 | Status: SHIPPED | OUTPATIENT
Start: 2020-03-20 | End: 2020-03-20 | Stop reason: SDUPTHER

## 2020-03-20 RX ORDER — DOXYCYCLINE HYCLATE 150 MG/1
150 TABLET ORAL AS NEEDED
Qty: 30 TABLET | Refills: 0 | Status: SHIPPED | OUTPATIENT
Start: 2020-03-20 | End: 2020-04-19

## 2020-03-20 NOTE — TELEPHONE ENCOUNTER
Medication refill request: doxycycline hyclate 150 mg   Last office visit: 12/6/19  Next office visit: none   Last refilled: historical   Pharmacy:   [id number]- [address] [facility], [address] [place] [address],   [address]  [address] [place] [address]  Phone: [phone] Fax: [phone]

## 2020-04-15 DIAGNOSIS — F41.1 GENERALIZED ANXIETY DISORDER: ICD-10-CM

## 2020-04-15 RX ORDER — BUPROPION HYDROCHLORIDE 150 MG/1
TABLET ORAL
Qty: 30 TABLET | Refills: 1 | Status: SHIPPED | OUTPATIENT
Start: 2020-04-15 | End: 2020-05-07 | Stop reason: SDUPTHER

## 2020-04-16 ENCOUNTER — TELEMEDICINE (OUTPATIENT)
Dept: SLEEP CENTER | Facility: CLINIC | Age: 40
End: 2020-04-16
Payer: COMMERCIAL

## 2020-04-16 VITALS — BODY MASS INDEX: 31.45 KG/M2 | WEIGHT: 213 LBS

## 2020-04-16 DIAGNOSIS — G47.33 OSA (OBSTRUCTIVE SLEEP APNEA): Primary | ICD-10-CM

## 2020-04-16 PROCEDURE — 99213 OFFICE O/P EST LOW 20 MIN: CPT | Performed by: PSYCHIATRY & NEUROLOGY

## 2020-05-07 DIAGNOSIS — F41.1 GENERALIZED ANXIETY DISORDER: ICD-10-CM

## 2020-05-07 RX ORDER — BUPROPION HYDROCHLORIDE 150 MG/1
TABLET ORAL
Qty: 30 TABLET | Refills: 1 | Status: SHIPPED | OUTPATIENT
Start: 2020-05-07 | End: 2020-05-18

## 2020-05-16 DIAGNOSIS — F41.1 GENERALIZED ANXIETY DISORDER: ICD-10-CM

## 2020-05-18 RX ORDER — BUPROPION HYDROCHLORIDE 150 MG/1
TABLET ORAL
Qty: 90 TABLET | Refills: 1 | Status: SHIPPED | OUTPATIENT
Start: 2020-05-18 | End: 2020-08-26

## 2020-06-24 DIAGNOSIS — L71.9 ROSACEA: Primary | ICD-10-CM

## 2020-06-24 RX ORDER — DOXYCYCLINE HYCLATE 150 MG/1
150 TABLET ORAL AS NEEDED
COMMUNITY
End: 2020-08-27

## 2020-06-24 RX ORDER — DOXYCYCLINE HYCLATE 150 MG/1
150 TABLET ORAL DAILY
Qty: 30 TABLET | Refills: 0 | Status: CANCELLED | OUTPATIENT
Start: 2020-06-24 | End: 2020-07-24

## 2020-08-26 DIAGNOSIS — F41.1 GENERALIZED ANXIETY DISORDER: ICD-10-CM

## 2020-08-26 DIAGNOSIS — L71.9 ROSACEA, UNSPECIFIED: ICD-10-CM

## 2020-08-26 RX ORDER — BUPROPION HYDROCHLORIDE 150 MG/1
TABLET ORAL
Qty: 30 TABLET | Refills: 1 | Status: SHIPPED | OUTPATIENT
Start: 2020-08-26 | End: 2020-10-16 | Stop reason: SDUPTHER

## 2020-08-27 RX ORDER — DOXYCYCLINE HYCLATE 150 MG/1
TABLET ORAL
Qty: 30 TABLET | Refills: 0 | Status: SHIPPED | OUTPATIENT
Start: 2020-08-27 | End: 2020-11-17

## 2020-10-16 DIAGNOSIS — F41.1 GENERALIZED ANXIETY DISORDER: ICD-10-CM

## 2020-10-16 RX ORDER — BUPROPION HYDROCHLORIDE 150 MG/1
TABLET ORAL
Qty: 30 TABLET | Refills: 1 | Status: SHIPPED | OUTPATIENT
Start: 2020-10-16 | End: 2020-12-15

## 2020-11-06 ENCOUNTER — OFFICE VISIT (OUTPATIENT)
Dept: FAMILY MEDICINE CLINIC | Facility: CLINIC | Age: 40
End: 2020-11-06
Payer: COMMERCIAL

## 2020-11-06 VITALS
RESPIRATION RATE: 16 BRPM | HEART RATE: 88 BPM | OXYGEN SATURATION: 97 % | DIASTOLIC BLOOD PRESSURE: 64 MMHG | SYSTOLIC BLOOD PRESSURE: 110 MMHG | BODY MASS INDEX: 32.5 KG/M2 | HEIGHT: 70 IN | TEMPERATURE: 97.7 F | WEIGHT: 227 LBS

## 2020-11-06 DIAGNOSIS — Z00.00 PHYSICAL EXAM, ANNUAL: Primary | ICD-10-CM

## 2020-11-06 DIAGNOSIS — Z13.220 SCREENING FOR LIPOID DISORDERS: ICD-10-CM

## 2020-11-06 DIAGNOSIS — G47.33 OSA (OBSTRUCTIVE SLEEP APNEA): ICD-10-CM

## 2020-11-06 DIAGNOSIS — Z11.4 ENCOUNTER FOR SCREENING FOR HIV: ICD-10-CM

## 2020-11-06 DIAGNOSIS — F41.1 GENERALIZED ANXIETY DISORDER: ICD-10-CM

## 2020-11-06 DIAGNOSIS — Z23 ENCOUNTER FOR IMMUNIZATION: ICD-10-CM

## 2020-11-06 PROCEDURE — 3725F SCREEN DEPRESSION PERFORMED: CPT | Performed by: FAMILY MEDICINE

## 2020-11-06 PROCEDURE — 90471 IMMUNIZATION ADMIN: CPT | Performed by: FAMILY MEDICINE

## 2020-11-06 PROCEDURE — 99396 PREV VISIT EST AGE 40-64: CPT | Performed by: FAMILY MEDICINE

## 2020-11-06 PROCEDURE — 1036F TOBACCO NON-USER: CPT | Performed by: FAMILY MEDICINE

## 2020-11-06 PROCEDURE — 90686 IIV4 VACC NO PRSV 0.5 ML IM: CPT | Performed by: FAMILY MEDICINE

## 2020-11-06 PROCEDURE — 3008F BODY MASS INDEX DOCD: CPT | Performed by: FAMILY MEDICINE

## 2020-11-06 RX ORDER — ESCITALOPRAM OXALATE 20 MG/1
20 TABLET ORAL DAILY
Qty: 30 TABLET | Refills: 3 | Status: SHIPPED | OUTPATIENT
Start: 2020-11-06 | End: 2021-02-15 | Stop reason: SDUPTHER

## 2020-11-15 DIAGNOSIS — L71.9 ROSACEA, UNSPECIFIED: ICD-10-CM

## 2020-11-17 RX ORDER — DOXYCYCLINE HYCLATE 150 MG/1
TABLET ORAL
Qty: 30 TABLET | Refills: 0 | Status: SHIPPED | OUTPATIENT
Start: 2020-11-17 | End: 2021-02-19 | Stop reason: SDUPTHER

## 2020-12-15 DIAGNOSIS — F41.1 GENERALIZED ANXIETY DISORDER: ICD-10-CM

## 2020-12-15 RX ORDER — BUPROPION HYDROCHLORIDE 150 MG/1
TABLET ORAL
Qty: 30 TABLET | Refills: 1 | Status: SHIPPED | OUTPATIENT
Start: 2020-12-15 | End: 2021-01-11

## 2020-12-18 ENCOUNTER — TELEMEDICINE (OUTPATIENT)
Dept: FAMILY MEDICINE CLINIC | Facility: CLINIC | Age: 40
End: 2020-12-18
Payer: COMMERCIAL

## 2020-12-18 VITALS — BODY MASS INDEX: 31.92 KG/M2 | HEIGHT: 70 IN | WEIGHT: 223 LBS

## 2020-12-18 DIAGNOSIS — F41.1 GENERALIZED ANXIETY DISORDER: Primary | ICD-10-CM

## 2020-12-18 PROCEDURE — 3008F BODY MASS INDEX DOCD: CPT | Performed by: FAMILY MEDICINE

## 2020-12-18 PROCEDURE — 99213 OFFICE O/P EST LOW 20 MIN: CPT | Performed by: FAMILY MEDICINE

## 2021-01-11 DIAGNOSIS — F41.1 GENERALIZED ANXIETY DISORDER: ICD-10-CM

## 2021-01-11 RX ORDER — BUPROPION HYDROCHLORIDE 150 MG/1
TABLET ORAL
Qty: 30 TABLET | Refills: 1 | Status: SHIPPED | OUTPATIENT
Start: 2021-01-11 | End: 2021-01-26

## 2021-01-25 DIAGNOSIS — F41.1 GENERALIZED ANXIETY DISORDER: ICD-10-CM

## 2021-01-26 RX ORDER — BUPROPION HYDROCHLORIDE 150 MG/1
TABLET ORAL
Qty: 90 TABLET | Refills: 1 | Status: SHIPPED | OUTPATIENT
Start: 2021-01-26 | End: 2021-05-17

## 2021-02-14 DIAGNOSIS — F41.1 GENERALIZED ANXIETY DISORDER: ICD-10-CM

## 2021-02-15 RX ORDER — ESCITALOPRAM OXALATE 20 MG/1
TABLET ORAL
Qty: 90 TABLET | Refills: 1 | Status: SHIPPED | OUTPATIENT
Start: 2021-02-15 | End: 2021-08-16

## 2021-02-19 DIAGNOSIS — L71.9 ROSACEA, UNSPECIFIED: ICD-10-CM

## 2021-02-19 RX ORDER — DOXYCYCLINE HYCLATE 150 MG/1
150 TABLET ORAL AS NEEDED
Qty: 30 TABLET | Refills: 0 | Status: SHIPPED | OUTPATIENT
Start: 2021-02-19 | End: 2021-04-21

## 2021-02-19 RX ORDER — DOXYCYCLINE HYCLATE 150 MG/1
TABLET ORAL
Qty: 30 TABLET | Refills: 0 | OUTPATIENT
Start: 2021-02-19 | End: 2022-02-14

## 2021-02-19 NOTE — TELEPHONE ENCOUNTER
Medication refill requested: Doxycycline   Last office visit: 12/18/20  Next office visit: None   Last refilled: 11/17/20, 30 x 0  Pharmacy :   [facility] #[id number]- [place], [address] [place] [address],   [address] [place] [address]  Phone: [phone] Fax: [phone]       Pended: 30 x 0

## 2021-04-21 DIAGNOSIS — L71.9 ROSACEA, UNSPECIFIED: ICD-10-CM

## 2021-04-21 RX ORDER — DOXYCYCLINE HYCLATE 150 MG/1
TABLET ORAL
Qty: 30 TABLET | Refills: 0 | Status: SHIPPED | OUTPATIENT
Start: 2021-04-21 | End: 2021-07-09

## 2021-05-15 DIAGNOSIS — F41.1 GENERALIZED ANXIETY DISORDER: ICD-10-CM

## 2021-05-17 RX ORDER — BUPROPION HYDROCHLORIDE 150 MG/1
TABLET ORAL
Qty: 90 TABLET | Refills: 1 | Status: SHIPPED | OUTPATIENT
Start: 2021-05-17 | End: 2021-11-22

## 2021-07-09 DIAGNOSIS — L71.9 ROSACEA, UNSPECIFIED: ICD-10-CM

## 2021-07-09 RX ORDER — DOXYCYCLINE HYCLATE 150 MG/1
TABLET ORAL
Qty: 30 TABLET | Refills: 0 | Status: SHIPPED | OUTPATIENT
Start: 2021-07-09 | End: 2021-08-08

## 2021-07-14 DIAGNOSIS — L71.9 ROSACEA, UNSPECIFIED: ICD-10-CM

## 2021-07-14 RX ORDER — DOXYCYCLINE HYCLATE 150 MG/1
TABLET ORAL
Qty: 30 TABLET | Refills: 0 | OUTPATIENT
Start: 2021-07-14 | End: 2021-08-13

## 2021-08-14 DIAGNOSIS — F41.1 GENERALIZED ANXIETY DISORDER: ICD-10-CM

## 2021-08-16 RX ORDER — ESCITALOPRAM OXALATE 20 MG/1
20 TABLET ORAL DAILY
Qty: 90 TABLET | Refills: 1 | Status: SHIPPED | OUTPATIENT
Start: 2021-08-16 | End: 2022-08-03

## 2021-08-16 NOTE — TELEPHONE ENCOUNTER
Medication refill requested: Lexapro   Last office visit: 12/18/20  Next office visit: None  Last refilled: 2/15/21  Pharmacy :   [facility] #[id number]- [place], [address],   [address] [place] [address]  Phone: [phone] Fax: [phone]       Pended: 90 x 1

## 2021-08-27 ENCOUNTER — TELEPHONE (OUTPATIENT)
Dept: FAMILY MEDICINE CLINIC | Facility: CLINIC | Age: 41
End: 2021-08-27

## 2021-08-27 DIAGNOSIS — Z20.822 CLOSE EXPOSURE TO COVID-19 VIRUS: Primary | ICD-10-CM

## 2021-08-27 PROCEDURE — U0003 INFECTIOUS AGENT DETECTION BY NUCLEIC ACID (DNA OR RNA); SEVERE ACUTE RESPIRATORY SYNDROME CORONAVIRUS 2 (SARS-COV-2) (CORONAVIRUS DISEASE [COVID-19]), AMPLIFIED PROBE TECHNIQUE, MAKING USE OF HIGH THROUGHPUT TECHNOLOGIES AS DESCRIBED BY CMS-2020-01-R: HCPCS | Performed by: FAMILY MEDICINE

## 2021-08-27 PROCEDURE — U0005 INFEC AGEN DETEC AMPLI PROBE: HCPCS | Performed by: FAMILY MEDICINE

## 2021-08-27 NOTE — TELEPHONE ENCOUNTER
Patient called with the following Covid-19 concern:    Patient was exposed to Covid-19? yes  Date of Exposure? 8/20/21   Date of last exposure (family member in quarantine) everyone is quarantining since 8/20/21      Patient has the following symptoms: none  Date symptoms started: none    Is the patient a hospital employee?  no  Has patient been vaccinated? yes   If so, when was their last COVID vaccine? 4/9/21    Will route the following message as HIGH priority to a provider currently in the office for review

## 2021-11-21 DIAGNOSIS — F41.1 GENERALIZED ANXIETY DISORDER: ICD-10-CM

## 2021-11-22 RX ORDER — BUPROPION HYDROCHLORIDE 150 MG/1
TABLET ORAL
Qty: 90 TABLET | Refills: 1 | Status: SHIPPED | OUTPATIENT
Start: 2021-11-22 | End: 2022-05-22

## 2022-02-15 DIAGNOSIS — F41.1 GENERALIZED ANXIETY DISORDER: ICD-10-CM

## 2022-02-15 RX ORDER — ESCITALOPRAM OXALATE 20 MG/1
TABLET ORAL
Qty: 90 TABLET | Refills: 1 | OUTPATIENT
Start: 2022-02-15

## 2022-03-17 ENCOUNTER — TELEPHONE (OUTPATIENT)
Dept: FAMILY MEDICINE CLINIC | Facility: CLINIC | Age: 42
End: 2022-03-17

## 2022-03-17 DIAGNOSIS — J10.1 INFLUENZA A: Primary | ICD-10-CM

## 2022-03-17 RX ORDER — OSELTAMIVIR PHOSPHATE 75 MG/1
75 CAPSULE ORAL 2 TIMES DAILY
Qty: 10 CAPSULE | Refills: 0 | Status: SHIPPED | OUTPATIENT
Start: 2022-03-17 | End: 2022-03-22

## 2022-03-17 NOTE — TELEPHONE ENCOUNTER
Wife called  Her mom has flu A and now Maynor Acosta and the kids have fever/cough  Will start tamiflu

## 2022-05-10 ENCOUNTER — TELEMEDICINE (OUTPATIENT)
Dept: FAMILY MEDICINE CLINIC | Facility: CLINIC | Age: 42
End: 2022-05-10
Payer: COMMERCIAL

## 2022-05-10 VITALS — WEIGHT: 232 LBS | HEIGHT: 70 IN | BODY MASS INDEX: 33.21 KG/M2

## 2022-05-10 DIAGNOSIS — F33.1 MODERATE EPISODE OF RECURRENT MAJOR DEPRESSIVE DISORDER (HCC): Primary | ICD-10-CM

## 2022-05-10 DIAGNOSIS — Z13.220 SCREENING FOR LIPOID DISORDERS: ICD-10-CM

## 2022-05-10 DIAGNOSIS — Z11.4 SCREENING FOR HIV (HUMAN IMMUNODEFICIENCY VIRUS): ICD-10-CM

## 2022-05-10 DIAGNOSIS — Z11.59 NEED FOR HEPATITIS C SCREENING TEST: ICD-10-CM

## 2022-05-10 PROCEDURE — 3725F SCREEN DEPRESSION PERFORMED: CPT | Performed by: FAMILY MEDICINE

## 2022-05-10 PROCEDURE — 3008F BODY MASS INDEX DOCD: CPT | Performed by: FAMILY MEDICINE

## 2022-05-10 PROCEDURE — 99213 OFFICE O/P EST LOW 20 MIN: CPT | Performed by: FAMILY MEDICINE

## 2022-05-11 NOTE — PROGRESS NOTES
Virtual Brief Visit    Patient is located in the following state in which I hold an active license [place]      Assessment/Plan:    Problem List Items Addressed This Visit     None      Visit Diagnoses     Moderate episode of recurrent major depressive disorder ([facility] [address] )    -  Primary    unclear whether wellbutrin has been helpful  obtain genesite testing and then reassess  labs as ordered    Relevant Orders    Comprehensive metabolic panel    TSH, 3rd generation with Free T4 reflex    CBC and differential    Need for hepatitis C screening test        Relevant Orders    Hepatitis C Antibody ([facility])    Screening for HIV (human immunodeficiency virus)        Relevant Orders    HIV 1/2 Antigen/Antibody (4th Generation) w Reflex [facility]    Screening for lipoid disorders        Relevant Orders    Lipid panel          Recent Visits  No visits were found meeting these conditions  Showing recent visits within past 7 days and meeting all other requirements  Today's Visits  Date Type Provider Dept   05/10/22 Telemedicine One[facility] Gutierres, [facility] today's visits and meeting all other requirements  Future Appointments  No visits were found meeting these conditions  Showing future appointments within next 150 days and meeting all other requirements         I spent 15 minutes directly with the patient during this visit        Spoke with pt by phone  Was holding baby who has b/l ear infections and couldn't do video visit  Concerned because wife feels he's more depressed  He feels stressed out by trying to work FT from home and take care of the kids  Major stressors within the extended family  Feels irritable, low energy  Didn't shower for 5 days  Poor sleep  Thinks maybe the wellbutrin isn't helpful  Feels like things are a little better over the past few weeks as wife is assisting with some of the home issues  He would like to obtain labs and is interested in doing some genesite testing    No sihi

## 2022-05-22 DIAGNOSIS — F41.1 GENERALIZED ANXIETY DISORDER: ICD-10-CM

## 2022-05-22 RX ORDER — BUPROPION HYDROCHLORIDE 150 MG/1
TABLET ORAL
Qty: 90 TABLET | Refills: 0 | Status: SHIPPED | OUTPATIENT
Start: 2022-05-22 | End: 2022-08-03

## 2022-05-23 NOTE — TELEPHONE ENCOUNTER
Medication refill requested: wellbutrin  Last office visit: 5/10/22  Next office visit: 6/21/22  Last refilled: 11/22/21 #90x1  Labs: No  Ordering Provider: Oswaldo Templeton (select pharmacy send RX to):   [id number] [place], [address],   [address] [place] [address]  Phone: [phone] Fax: [phone]          Refilled for 90 days  Last OV note 5/10/22:   Moderate episode of recurrent major depressive disorder ([facility] [address] )    -  Primary      unclear whether wellbutrin has been helpful  obtain genesite testing and then reassess  labs as ordered

## 2022-07-02 ENCOUNTER — APPOINTMENT (OUTPATIENT)
Dept: LAB | Facility: CLINIC | Age: 42
End: 2022-07-02
Payer: COMMERCIAL

## 2022-07-02 DIAGNOSIS — Z13.220 SCREENING FOR LIPOID DISORDERS: ICD-10-CM

## 2022-07-02 DIAGNOSIS — Z11.59 NEED FOR HEPATITIS C SCREENING TEST: ICD-10-CM

## 2022-07-02 DIAGNOSIS — Z11.4 SCREENING FOR HIV (HUMAN IMMUNODEFICIENCY VIRUS): ICD-10-CM

## 2022-07-02 DIAGNOSIS — F33.1 MODERATE EPISODE OF RECURRENT MAJOR DEPRESSIVE DISORDER (HCC): ICD-10-CM

## 2022-07-02 LAB
ALBUMIN SERPL BCP-MCNC: 4.3 G/DL (ref 3.5–5)
ALP SERPL-CCNC: 43 U/L (ref 34–104)
ALT SERPL W P-5'-P-CCNC: 16 U/L (ref 7–52)
ANION GAP SERPL CALCULATED.3IONS-SCNC: 6 MMOL/L (ref 4–13)
AST SERPL W P-5'-P-CCNC: 19 U/L (ref 13–39)
BASOPHILS # BLD AUTO: 0.05 THOUSANDS/ΜL (ref 0–0.1)
BASOPHILS NFR BLD AUTO: 1 % (ref 0–1)
BILIRUB SERPL-MCNC: 0.42 MG/DL (ref 0.2–1)
BUN SERPL-MCNC: 9 MG/DL (ref 5–25)
CALCIUM SERPL-MCNC: 9.2 MG/DL (ref 8.4–10.2)
CHLORIDE SERPL-SCNC: 102 MMOL/L (ref 96–108)
CHOLEST SERPL-MCNC: 247 MG/DL
CO2 SERPL-SCNC: 27 MMOL/L (ref 21–32)
CREAT SERPL-MCNC: 0.71 MG/DL (ref 0.6–1.3)
EOSINOPHIL # BLD AUTO: 0.17 THOUSAND/ΜL (ref 0–0.61)
EOSINOPHIL NFR BLD AUTO: 3 % (ref 0–6)
ERYTHROCYTE [DISTWIDTH] IN BLOOD BY AUTOMATED COUNT: 12.4 % (ref 11.6–15.1)
GFR SERPL CREATININE-BSD FRML MDRD: 116 ML/MIN/1.73SQ M
GLUCOSE P FAST SERPL-MCNC: 92 MG/DL (ref 65–99)
HCT VFR BLD AUTO: 46.1 % (ref 36.5–49.3)
HCV AB SER QL: NORMAL
HDLC SERPL-MCNC: 46 MG/DL
HGB BLD-MCNC: 16 G/DL (ref 12–17)
IMM GRANULOCYTES # BLD AUTO: 0.04 THOUSAND/UL (ref 0–0.2)
IMM GRANULOCYTES NFR BLD AUTO: 1 % (ref 0–2)
LDLC SERPL CALC-MCNC: 156 MG/DL (ref 0–100)
LYMPHOCYTES # BLD AUTO: 1.27 THOUSANDS/ΜL (ref 0.6–4.47)
LYMPHOCYTES NFR BLD AUTO: 25 % (ref 14–44)
MCH RBC QN AUTO: 32.3 PG (ref 26.8–34.3)
MCHC RBC AUTO-ENTMCNC: 34.7 G/DL (ref 31.4–37.4)
MCV RBC AUTO: 93 FL (ref 82–98)
MONOCYTES # BLD AUTO: 0.63 THOUSAND/ΜL (ref 0.17–1.22)
MONOCYTES NFR BLD AUTO: 12 % (ref 4–12)
NEUTROPHILS # BLD AUTO: 3.02 THOUSANDS/ΜL (ref 1.85–7.62)
NEUTS SEG NFR BLD AUTO: 58 % (ref 43–75)
NONHDLC SERPL-MCNC: 201 MG/DL
NRBC BLD AUTO-RTO: 0 /100 WBCS
PLATELET # BLD AUTO: 248 THOUSANDS/UL (ref 149–390)
PMV BLD AUTO: 8.8 FL (ref 8.9–12.7)
POTASSIUM SERPL-SCNC: 4.4 MMOL/L (ref 3.5–5.3)
PROT SERPL-MCNC: 7.4 G/DL (ref 6.4–8.4)
RBC # BLD AUTO: 4.96 MILLION/UL (ref 3.88–5.62)
SODIUM SERPL-SCNC: 135 MMOL/L (ref 135–147)
TRIGL SERPL-MCNC: 226 MG/DL
TSH SERPL DL<=0.05 MIU/L-ACNC: 2.16 UIU/ML (ref 0.45–4.5)
WBC # BLD AUTO: 5.18 THOUSAND/UL (ref 4.31–10.16)

## 2022-07-02 PROCEDURE — 85025 COMPLETE CBC W/AUTO DIFF WBC: CPT

## 2022-07-02 PROCEDURE — 80061 LIPID PANEL: CPT

## 2022-07-02 PROCEDURE — 86803 HEPATITIS C AB TEST: CPT

## 2022-07-02 PROCEDURE — 87389 HIV-1 AG W/HIV-1&-2 AB AG IA: CPT

## 2022-07-02 PROCEDURE — 80053 COMPREHEN METABOLIC PANEL: CPT

## 2022-07-02 PROCEDURE — 84443 ASSAY THYROID STIM HORMONE: CPT

## 2022-07-02 PROCEDURE — 36415 COLL VENOUS BLD VENIPUNCTURE: CPT

## 2022-07-04 LAB — HIV 1+2 AB+HIV1 P24 AG SERPL QL IA: NORMAL

## 2022-07-06 ENCOUNTER — CLINICAL SUPPORT (OUTPATIENT)
Dept: FAMILY MEDICINE CLINIC | Facility: CLINIC | Age: 42
End: 2022-07-06

## 2022-07-06 DIAGNOSIS — F41.1 GENERALIZED ANXIETY DISORDER: Primary | ICD-10-CM

## 2022-08-03 ENCOUNTER — OFFICE VISIT (OUTPATIENT)
Dept: FAMILY MEDICINE CLINIC | Facility: CLINIC | Age: 42
End: 2022-08-03
Payer: COMMERCIAL

## 2022-08-03 VITALS
HEIGHT: 70 IN | SYSTOLIC BLOOD PRESSURE: 110 MMHG | WEIGHT: 218 LBS | BODY MASS INDEX: 31.21 KG/M2 | RESPIRATION RATE: 16 BRPM | DIASTOLIC BLOOD PRESSURE: 78 MMHG | TEMPERATURE: 97.7 F | OXYGEN SATURATION: 96 % | HEART RATE: 82 BPM

## 2022-08-03 DIAGNOSIS — F41.1 GENERALIZED ANXIETY DISORDER: Primary | ICD-10-CM

## 2022-08-03 PROCEDURE — 99214 OFFICE O/P EST MOD 30 MIN: CPT | Performed by: FAMILY MEDICINE

## 2022-08-03 RX ORDER — DESVENLAFAXINE 50 MG/1
50 TABLET, EXTENDED RELEASE ORAL DAILY
Qty: 30 TABLET | Refills: 3 | Status: SHIPPED | OUTPATIENT
Start: 2022-08-03

## 2022-08-03 NOTE — PATIENT INSTRUCTIONS
Stop wellbutrin  And just take lexapro for 2 weeks  Then decrease lexapro to 10mg daily and start pristiq 50mg daily  After 1 week of lexapro at 10mg, stop and continue the pristiq as prescribed  F/u 6 weeks

## 2022-08-03 NOTE — PROGRESS NOTES
Assessment/Plan:    No problem-specific Assessment & Plan notes found for this encounter  Diagnoses and all orders for this visit:    Generalized anxiety disorder  Comments:  anxiety and depression  genesite shows low likelihood of success with most drugs  will wean off wellbutrin because he feels like it is making him blunted  Will wean lexapro and trial pristiq for anxiety/depression sx as this is more likely to be effective per testing  F/u 6 weeks    Orders:  -     desvenlafaxine succinate (PRISTIQ) 50 mg 24 hr tablet; Take 1 tablet (50 mg total) by mouth daily        Subjective:      Patient ID: Galina Hassan is a 43 y o  male  HPI  Pt presents in f/u for genesite testing  Pt feels like wellbutrin was making him anhedonic and blunted  genesite testing showed he was at high likelihood of sfx and wasn't a good metabolizer  He has been on lexapro, zoloft, prozac, and they haven't been terribly helpful (confirmed by testing)  Wife feels like he shows his mood with irritability  Small things go wrong and he yells or doesn't do well  Pt feels like this is because he is under a lot of pressure with the kids, home, work, helping MIL after VERO's death  Some worry  Feels like energy level is poor  Amount of work and dealing with kids can be overwhelming  The following portions of the patient's history were reviewed and updated as appropriate: allergies, current medications, past family history, past medical history, past social history, past surgical history and problem list     Review of Systems    See hpi    Objective:      /78   Pulse 82   Temp 97 7 °F (36 5 °C)   Resp 16   Ht 5' 10" (1 778 m)   Wt 98 9 kg (218 lb)   SpO2 96%   BMI 31 28 kg/m²          Physical Exam  Constitutional:       Appearance: Normal appearance  Pulmonary:      Effort: Pulmonary effort is normal       Breath sounds: Normal breath sounds  No wheezing, rhonchi or rales     Abdominal:      General: Abdomen is flat  Bowel sounds are normal       Palpations: Abdomen is soft  Tenderness: There is no abdominal tenderness  There is no guarding or rebound  Neurological:      General: No focal deficit present  Mental Status: He is alert and oriented to person, place, and time  Psychiatric:         Attention and Perception: Attention and perception normal          Mood and Affect: Mood is anxious  Speech: Speech is rapid and pressured  Behavior: Behavior normal          Thought Content:  Thought content normal          Cognition and Memory: Cognition normal          Judgment: Judgment normal

## 2022-09-21 ENCOUNTER — OFFICE VISIT (OUTPATIENT)
Dept: FAMILY MEDICINE CLINIC | Facility: CLINIC | Age: 42
End: 2022-09-21
Payer: COMMERCIAL

## 2022-09-21 VITALS
SYSTOLIC BLOOD PRESSURE: 128 MMHG | DIASTOLIC BLOOD PRESSURE: 84 MMHG | OXYGEN SATURATION: 98 % | BODY MASS INDEX: 31.21 KG/M2 | HEIGHT: 70 IN | WEIGHT: 218 LBS | HEART RATE: 93 BPM | TEMPERATURE: 96.8 F

## 2022-09-21 DIAGNOSIS — T30.0 BURN: Primary | ICD-10-CM

## 2022-09-21 DIAGNOSIS — F41.1 GENERALIZED ANXIETY DISORDER: ICD-10-CM

## 2022-09-21 PROCEDURE — 99213 OFFICE O/P EST LOW 20 MIN: CPT | Performed by: FAMILY MEDICINE

## 2022-09-21 NOTE — PROGRESS NOTES
Name: Moisés Mahoney      : 1980      MRN: [id number]  Encounter Provider: Lisette Bello DO  Encounter Date: 2022   Encounter department: [address] [place] [address]  Burn  Comments:  doesn't appear infected  continue routine care  watch for signs of infection and call if they occur    2  Generalized anxiety disorder  Comments:  improved on pristiq  continue same med  f/u 6 mo or sooner for any concerns           Subjective      HPI   Pt presents for f/u mood  At last visit, we started pristiq 50mg  6 weeks later, he reports feeling improved  Better energy level  Better motivation  Feeling less anxious  Denies irritability  Feels well and would like to continue same med  No si/hi  No hopelessness  Pt was using athlete's foot spray 5 days ago and used it very close to skin  Ended up burning skin  Had a blister and surrounding 1st degree burn  States he feels like it's getting better  Review of Systems  See hpi    Current Outpatient Medications on File Prior to Visit   Medication Sig    desvenlafaxine succinate (PRISTIQ) 50 mg 24 hr tablet Take 1 tablet (50 mg total) by mouth daily       Objective     /84   Pulse 93   Temp (!) 96 8 °F (36 °C)   Ht 5' 10" (1 778 m)   Wt 98 9 kg (218 lb)   SpO2 98%   BMI 31 28 kg/m²     Physical Exam     Gen :aaox3, NAD  HEENT: atraumatic, anicteric, EOMI, MMM  Heart: +s1/s2, -s3/s4, -m/r/g  Lungs: CTA b/l, -r/r/w  Neuro: CN's grossly intact, no focal deficits  Psych: nml mood, affect, eye contact, judgement, thought process  Skin: 7x5 cm area of darkened, dried skin with clear border and center approx 1x 1 5 open blister with underlying tissue  No surrounding erythema or swelling      Lisette Bello DO

## 2022-11-25 ENCOUNTER — TELEMEDICINE (OUTPATIENT)
Dept: FAMILY MEDICINE CLINIC | Facility: CLINIC | Age: 42
End: 2022-11-25

## 2022-11-25 VITALS — BODY MASS INDEX: 31.28 KG/M2 | WEIGHT: 218 LBS

## 2022-11-25 DIAGNOSIS — F41.1 GENERALIZED ANXIETY DISORDER: Primary | ICD-10-CM

## 2022-11-25 RX ORDER — DULOXETIN HYDROCHLORIDE 30 MG/1
CAPSULE, DELAYED RELEASE ORAL
Qty: 60 CAPSULE | Refills: 1 | Status: SHIPPED | OUTPATIENT
Start: 2022-11-25

## 2022-11-25 RX ORDER — SODIUM FLUORIDE 6 MG/ML
1 PASTE, DENTIFRICE DENTAL
COMMUNITY
Start: 2022-11-15

## 2022-11-25 NOTE — PROGRESS NOTES
Virtual Regular Visit    Verification of patient location:    Patient is located in the following state in which I hold an active license [place]      Assessment/Plan:    Problem List Items Addressed This Visit        Other    Generalized anxinot controlled  d/c pristiq   start cymbalta 30mg x 2 weeks, then increase to 60mg   f/u 1 moety disorder - Primary              Reason for visit is   Chief Complaint   Patient presents with   • Depression     Worsening depression and medications  • Virtual Regular Visit        Encounter provider Elijah Washington DO    Provider located at [address] [place] [id number] [phone]      Recent Visits  No visits were found meeting these conditions  Showing recent visits within past 7 days and meeting all other requirements  Today's Visits  Date Type Provider Dept   11/25/22 Telemedicine Elijah Washington, [facility] today's visits and meeting all other requirements  Future Appointments  No visits were found meeting these conditions  Showing future appointments within next 150 days and meeting all other requirements       The patient was identified by name and date of birth  Walter Coronel was informed that this is a telemedicine visit and that the visit is being conducted through the [facility]  He agrees to proceed     My office door was closed  No one else was in the room  He acknowledged consent and understanding of privacy and security of the video platform  The patient has agreed to participate and understands they can discontinue the visit at any time  Patient is aware this is a billable service  Subjective  Walter Coronel is a 43 y o  male who presents in mood f/u  Pt concerned that pristiq is not helping  Notes increased irritability, anxiety, panic, worry  Motivation equivocal   Energy okay  Anhedonia  Increased fatigue, but that may be covid    He feels dizziness on pristiq and has had episodes of feeling detached  No si/hi  HPI     Past Medical History:   Diagnosis Date   • Acne    • Anxiety Na    Just a checkup  Can be tele health   • Rosacea        History reviewed  No pertinent surgical history  Current Outpatient Medications   Medication Sig Dispense Refill   • desvenlafaxine succinate (PRISTIQ) 50 mg 24 hr tablet Take 1 tablet (50 mg total) by mouth daily 30 tablet 3   • Sodium Fluoride 5000 PPM 1 1 % PSTE Take 1 application by mouth daily at bedtime       No current facility-administered medications for this visit  No Known Allergies    Review of Systems  See hpi    Video Exam    Vitals:    11/25/22 1530   Weight: 98 9 kg (218 lb)       Physical Exam  Constitutional:       Appearance: Normal appearance  HENT:      Head: Normocephalic and atraumatic  Pulmonary:      Effort: Pulmonary effort is normal  No respiratory distress  Neurological:      Mental Status: He is alert  Psychiatric:         Attention and Perception: Attention normal          Mood and Affect: Mood is anxious  Speech: Speech is rapid and pressured  Behavior: Behavior normal          Thought Content:  Thought content normal          Judgment: Judgment normal           I spent 10 minutes directly with the patient during this visit

## 2022-12-21 DIAGNOSIS — F41.1 GENERALIZED ANXIETY DISORDER: Primary | ICD-10-CM

## 2022-12-21 RX ORDER — DULOXETIN HYDROCHLORIDE 60 MG/1
60 CAPSULE, DELAYED RELEASE ORAL DAILY
Qty: 30 CAPSULE | Refills: 3 | Status: SHIPPED | OUTPATIENT
Start: 2022-12-21

## 2022-12-30 ENCOUNTER — APPOINTMENT (OUTPATIENT)
Dept: RADIOLOGY | Facility: MEDICAL CENTER | Age: 42
End: 2022-12-30

## 2022-12-30 ENCOUNTER — TELEPHONE (OUTPATIENT)
Dept: OBGYN CLINIC | Facility: OTHER | Age: 42
End: 2022-12-30

## 2022-12-30 ENCOUNTER — OFFICE VISIT (OUTPATIENT)
Dept: URGENT CARE | Facility: MEDICAL CENTER | Age: 42
End: 2022-12-30

## 2022-12-30 VITALS
HEART RATE: 102 BPM | TEMPERATURE: 98.4 F | RESPIRATION RATE: 18 BRPM | WEIGHT: 220 LBS | OXYGEN SATURATION: 97 % | HEIGHT: 70 IN | BODY MASS INDEX: 31.5 KG/M2

## 2022-12-30 DIAGNOSIS — M77.8 TENDINITIS OF RADIAL STYLOID: ICD-10-CM

## 2022-12-30 DIAGNOSIS — M77.8 TENDINITIS OF RADIAL STYLOID: Primary | ICD-10-CM

## 2022-12-30 NOTE — PATIENT INSTRUCTIONS
1  Apply warm compresses to the injured area 3-4 times daily for 20-30 minutes until symptoms resolve  2   Remove the brace and perform range-of-motion/stretches as demonstrated 4 times daily, 5-6 reps  3  Go to the ER if your symptoms significantly worsen  4  Follow-up with orthopedics as soon as possible  A referral has been placed for you

## 2022-12-30 NOTE — TELEPHONE ENCOUNTER
Patient is being referred to a orthopedics  Please schedule accordingly      [address]   [phone]

## 2022-12-30 NOTE — PROGRESS NOTES
[facility]        NAME: Yulia Padilla is a 43 y o  male  : 1980    MRN: [id number]  DATE: 2022  TIME: 3:02 PM    Assessment and Plan   Tendinitis of radial styloid [M77 8]  1  Tendinitis of radial styloid  XR wrist 3+ vw left    Ambulatory referral to Orthopedic Surgery            Patient Instructions     1  Apply warm compresses to the injured area 3-4 times daily for 20-30 minutes until symptoms resolve  2   Remove the brace and perform range-of-motion/stretches as demonstrated 4 times daily, 5-6 reps  3  Go to the ER if your symptoms significantly worsen  4  Follow-up with orthopedics as soon as possible  A referral has been placed for you  Chief Complaint     Chief Complaint   Patient presents with   • Wrist Pain     Left wrist, injured while sleeping, happened 3 months ago  8/10 pain with use         History of Present Illness       42-year-old male patient with a 3-month history of persistent pain to the radial aspect of the left wrist   Patient states that the symptoms began on a specific night when he rolled over on his wrist and hyperextended his thumb  Since that time the symptoms have been persistent: Increased symptoms with range of motion, palpation, positioning  No distal sensory complaints  No proximal radiation of pain  Patient has not been evaluated for this to date  Review of Systems   Review of Systems   Constitutional: Negative for chills and fever  HENT: Negative for ear pain and sore throat  Eyes: Negative for pain and visual disturbance  Respiratory: Negative for cough and shortness of breath  Cardiovascular: Negative for chest pain and palpitations  Gastrointestinal: Negative for abdominal pain and vomiting  Genitourinary: Negative for dysuria and hematuria  Musculoskeletal: Positive for arthralgias  Negative for back pain  Skin: Negative for color change and rash  Neurological: Negative for seizures and syncope     All other systems reviewed and are negative  Current Medications       Current Outpatient Medications:   •  DULoxetine (CYMBALTA) 60 mg delayed release capsule, Take 1 capsule (60 mg total) by mouth daily, Disp: 30 capsule, Rfl: 3  •  Sodium Fluoride 5000 PPM 1 1 % PSTE, Take 1 application by mouth daily at bedtime (Patient not taking: Reported on 12/30/2022), Disp: , Rfl:     Current Allergies     Allergies as of 12/30/2022   • (No Known Allergies)            The following portions of the patient's history were reviewed and updated as appropriate: allergies, current medications, past family history, past medical history, past social history, past surgical history and problem list      Past Medical History:   Diagnosis Date   • Acne    • Anxiety Na    Just a checkup  Can be tele health   • Rosacea        History reviewed  No pertinent surgical history  Family History   Problem Relation Age of Onset   • Ulcers Mother    • Uterine cancer Mother    • GI problems Mother    • Atrial fibrillation Mother    • Hypertension Father    • Diabetes Father    • Colon polyps Brother    • Cancer Brother         Pre cancerous polyps   • Colon cancer Maternal Grandmother         Colon cancer   • Cancer Maternal Grandmother         Colon cancer   • Alzheimer's disease Maternal Grandfather    • Breast cancer Paternal Grandmother    • No Known Problems Paternal Grandfather    • Colon polyps Cousin    • Atrial fibrillation Sister 50   • Cancer Cousin         Pre cancerous polyps         Medications have been verified  Objective   Pulse 102   Temp 98 4 °F (36 9 °C) (Temporal)   Resp 18   Ht 5' 10" (1 778 m)   Wt 99 8 kg (220 lb)   SpO2 97%   BMI 31 57 kg/m²        Physical Exam     Physical Exam  Vitals and nursing note reviewed  Constitutional:       Appearance: Normal appearance  HENT:      Head: Normocephalic  Nose: Nose normal       Mouth/Throat:      Mouth: Mucous membranes are dry        Pharynx: Oropharynx is clear  Eyes:      Conjunctiva/sclera: Conjunctivae normal       Pupils: Pupils are equal, round, and reactive to light  Cardiovascular:      Rate and Rhythm: Normal rate and regular rhythm  Pulses: Normal pulses  Pulmonary:      Effort: Pulmonary effort is normal       Breath sounds: Normal breath sounds  Musculoskeletal:      Cervical back: Normal range of motion and neck supple  Comments: Left wrist is tender over the radial aspect  Range of motion is decreased in all planes due to pain  There is a positive Finkelstein's test   Distal neurovascular exam is intact  Skin:     General: Skin is warm and dry  Neurological:      Mental Status: He is alert  Psychiatric:         Mood and Affect: Mood normal          Behavior: Behavior normal            Orthopedic injury treatment    Date/Time: 12/30/2022 3:03 PM  Performed by: Marcie Zavala PA-C  Authorized by: Marcie Zavala PA-C     Patient Location:  [facility], [place] Protocol:  Consent: Verbal consent obtained  Risks and benefits: risks, benefits and alternatives were discussed  Consent given by: patient  Patient understanding: patient states understanding of the procedure being performed  Patient identity confirmed: verbally with patient      Injury location:  Wrist  Neurovascular status: Neurovascularly intact    Distal perfusion: normal    Neurological function: normal    Range of motion: reduced    Local anesthesia used?: No    Immobilization:  Brace  Neurovascular status: Neurovascularly intact    Distal perfusion: normal    Neurological function: normal    Range of motion: unchanged    Patient tolerance:  Patient tolerated the procedure well with no immediate complications   WTO wrist brace applied

## 2023-01-04 DIAGNOSIS — F41.1 GENERALIZED ANXIETY DISORDER: ICD-10-CM

## 2023-01-04 RX ORDER — DULOXETIN HYDROCHLORIDE 60 MG/1
CAPSULE, DELAYED RELEASE ORAL
Qty: 90 CAPSULE | Refills: 2 | Status: SHIPPED | OUTPATIENT
Start: 2023-01-04

## 2023-01-18 ENCOUNTER — OFFICE VISIT (OUTPATIENT)
Dept: OBGYN CLINIC | Facility: CLINIC | Age: 43
End: 2023-01-18

## 2023-01-18 VITALS
WEIGHT: 220 LBS | BODY MASS INDEX: 31.5 KG/M2 | SYSTOLIC BLOOD PRESSURE: 128 MMHG | DIASTOLIC BLOOD PRESSURE: 87 MMHG | HEART RATE: 88 BPM | HEIGHT: 70 IN

## 2023-01-18 DIAGNOSIS — M77.8 TENDINITIS OF RADIAL STYLOID: ICD-10-CM

## 2023-01-18 RX ORDER — BETAMETHASONE SODIUM PHOSPHATE AND BETAMETHASONE ACETATE 3; 3 MG/ML; MG/ML
3 INJECTION, SUSPENSION INTRA-ARTICULAR; INTRALESIONAL; INTRAMUSCULAR; SOFT TISSUE
Status: COMPLETED | OUTPATIENT
Start: 2023-01-18 | End: 2023-01-18

## 2023-01-18 RX ORDER — BUPIVACAINE HYDROCHLORIDE 2.5 MG/ML
2 INJECTION, SOLUTION INFILTRATION; PERINEURAL
Status: COMPLETED | OUTPATIENT
Start: 2023-01-18 | End: 2023-01-18

## 2023-01-18 RX ADMIN — BETAMETHASONE SODIUM PHOSPHATE AND BETAMETHASONE ACETATE 3 MG: 3; 3 INJECTION, SUSPENSION INTRA-ARTICULAR; INTRALESIONAL; INTRAMUSCULAR; SOFT TISSUE at 10:23

## 2023-01-18 RX ADMIN — BUPIVACAINE HYDROCHLORIDE 2 ML: 2.5 INJECTION, SOLUTION INFILTRATION; PERINEURAL at 10:23

## 2023-01-18 NOTE — PROGRESS NOTES
Assessment:    Left DeQuervain's tenosynovitis       Plan:    Steroid injection provided today and the patient tolerated well  Use the hand / wrist as tolerated, no restrictions  Can continue bracing at night  Follow-up 6-8 weeks for re-evaluation if symptoms persist        Visit Diagnoses     Tendinitis of radial styloid                       Subjective:     HPI    Patient ID:  Cecelia Qiu is a 43 y o  male presenting for evaluation of the left wrist   According to the patient, he presents with a 3-month history of progressively worsening left radial sided wrist pain  He states it started when he rolled over in bed 1 day and hyperextended his thumb and felt pain in the radial wrist   It has continued since then and is described as sharp and shooting and worse with activity  It is made worse when he lifts his daughter and has gotten to the point where he is unable to do this without significant pain  He then presented to urgent care clinic where x-rays were negative for acute findings  He was diagnosed with de Quervain's tenosynovitis and provided a splint  He was referred here  Today, the patient states his pain has not subsided  He has not used any creams or medication to the area  No associated numbness and tingling into the fingers  There was no rahul injury or trauma to the area since then    No history of surgery to the left wrist     The following portions of the patient's history were reviewed and updated as appropriate: allergies, current medications, past family history, past medical history, past social history, past surgical history, and problem list     Review of Systems     Objective:    Imaging:  Left wrist x-rays 12/30/2023  VIEWS:  XR WRIST 3+ VW LEFT         FINDINGS:     There is no acute fracture or dislocation      Mild 1st carpometacarpal osteoarthritis      No lytic or blastic osseous lesion      Soft tissues are unremarkable      IMPRESSION:     No acute osseous abnormality      Mild 1st carpometacarpal osteoarthritis  Physical Exam     Vitals:    01/18/23 0956   BP: 128/87   Pulse: 88       General appearance:  NAD   Cardiac:  Regular rate  Lungs:  Unlabored breathing  Abdomen:  Non-distended    Orthopedic Examination:  Left wrist     Inspection: No open wounds or erythema  Mild swelling of the first dorsal compartment  No ecchymosis  No effusion of the wrist     Palpation: Significant tenderness to palpation first dorsal extensor compartment  NTTP remaining dorsal wrist   NTTP thumb CMC joint  No abnormal warmth of the skin  Range-of-motion: Normal wrist range of motion, full composite fist formation    Strength: 5 out of 5 wrist flexion extension, thumb flexion extension, APB    Sensation: Intact median radial ulnar nerve distribution    Special Tests: Positive Finkelstein's  Negative grind test  No pain or laxity with UCL RCL stress  Palpable radial pulse    Hand/upper extremity injection: L extensor compartment 1  Universal Protocol:  Consent: Verbal consent obtained    Risks and benefits: risks, benefits and alternatives were discussed  Consent given by: patient    Supporting Documentation  Indications: pain and tendon swelling   Procedure Details  Condition:de Quervain's tenosynovitis Site: L extensor compartment 1   Preparation: Patient was prepped and draped in the usual sterile fashion  Needle size: 25 G  Approach: radial  Medications administered: 2 mL bupivacaine 0 25 %; 3 mg betamethasone acetate-betamethasone sodium phosphate 6 (3-3) mg/mL    Patient tolerance: patient tolerated the procedure well with no immediate complications  Dressing:  Sterile dressing applied

## 2023-02-26 ENCOUNTER — HOSPITAL ENCOUNTER (EMERGENCY)
Facility: HOSPITAL | Age: 43
Discharge: HOME | End: 2023-02-26
Attending: EMERGENCY MEDICINE

## 2023-02-26 VITALS
TEMPERATURE: 98.2 F | OXYGEN SATURATION: 95 % | RESPIRATION RATE: 14 BRPM | HEART RATE: 78 BPM | SYSTOLIC BLOOD PRESSURE: 161 MMHG | DIASTOLIC BLOOD PRESSURE: 91 MMHG

## 2023-02-26 DIAGNOSIS — S05.01XA ABRASION OF RIGHT CORNEA, INITIAL ENCOUNTER: Primary | ICD-10-CM

## 2023-02-26 RX ORDER — KETOROLAC TROMETHAMINE 5 MG/ML
1 SOLUTION OPHTHALMIC EVERY 6 HOURS
Qty: 1 ML | Refills: 0 | Status: SHIPPED | OUTPATIENT
Start: 2023-02-26 | End: 2023-03-03

## 2023-02-26 RX ORDER — ERYTHROMYCIN 5 MG/G
0.5 OINTMENT OPHTHALMIC
Qty: 3.5 G | Refills: 0 | Status: SHIPPED | OUTPATIENT
Start: 2023-02-26

## 2023-02-26 RX ORDER — ERYTHROMYCIN 5 MG/G
0.5 OINTMENT OPHTHALMIC ONCE
Status: COMPLETED | OUTPATIENT
Start: 2023-02-26 | End: 2023-02-26

## 2023-02-26 RX ORDER — TETRACAINE HYDROCHLORIDE 5 MG/ML
2 SOLUTION OPHTHALMIC ONCE
Status: COMPLETED | OUTPATIENT
Start: 2023-02-26 | End: 2023-02-26

## 2023-02-26 RX ADMIN — FLUORESCEIN SODIUM 1 STRIP: 1 STRIP OPHTHALMIC at 10:39

## 2023-02-26 RX ADMIN — ERYTHROMYCIN 0.5 INCH: 5 OINTMENT OPHTHALMIC at 11:19

## 2023-02-26 RX ADMIN — TETRACAINE HYDROCHLORIDE 2 DROP: 5 SOLUTION OPHTHALMIC at 10:39

## 2023-02-26 NOTE — DISCHARGE INSTRUCTIONS
If you have significant worsening of pain, please return to the ER for re-evaluation as we discussed  Otherwise, keep using the eye ointment

## 2023-02-26 NOTE — Clinical Note
Marlene Cormier was seen and treated in our emergency department on 2/26/2023  No restrictions            Diagnosis: Corneal abrasion    Brody    He may return on this date: 02/27/2023         If you have any questions or concerns, please don't hesitate to call        Tim Espinoza MD    ______________________________           _______________          _______________  Hospital Representative                              Date                                Time

## 2023-02-26 NOTE — ED PROVIDER NOTES
Final Diagnoses:     1  Abrasion of right cornea, initial encounter      ED Course as of 02/26/23 1106   Sun Feb 26, 2023   1103 I reviewed all testing with the patient:   I gave oral return precautions for what to return for in addition to the written return precautions  The patient verbalized understanding of the discharge instructions and warnings that would necessitate return to the Emergency Department  I specifically highlighted areas of special concern regarding the written and verbal discharge instructions and return precautions  All questions were answered prior to discharge  Nursing Triage:     Chief Complaint   Patient presents with   • Eye Injury     Pt presents to the ED with c/o eye injury that occurred last night  Walked into a tree, right eye hurts  HPI:   This is a 43 y o  male presenting for evaluation of poke to the RIGHT eye  The patient was walking in the backyard and somehow a branch poked him in the eye  Since then he has had tearing and pain  He slept  He woke up and still had the pain  Denies f/ch/nv  No photophobia  No tearing today  Denies foreign body sensation  Hx of foreign bodies in the eye years ago (when he was a )  He did try to irrigate his eye ("power wash") but it didn't help  ASSESSMENT + PLAN:   Eye trauma:  -   Physical:   Pertinent: grossly normal  Eye Exam:     POS (Lovely-orbital structures): nothing noted around eye / near orbit     LLL (lids, lashes, lacrimals): no lesions, no blepharitis (crusty base of eyelashes), dacryocystitis (tearing/swelling of medial edge of lower lid near drain), hordeolum (a pustule at lash line), chalazion (oil gland at tarsal plate)     C/S (conjunctiva, sclera): white and quiet; no conjunctivitis   No subconjunctival hemorrhage     K (Cornea): +right fluorescein uptake +corneal abrasion x2, no herpetic dendritic staining pattern noted       AC (Anterior chamber): deep and quiet     I (Iris): round and reactive     L (Lens): Clear     VA (visual acuity: pending per nurse     EOM intact, PERRLA     Retina: sharp disc margins with pan-opthalmoscope  Eye pain resolves with proparacaine administration     There is a stye on the inner aspect of the RIGHT upper eyelid, but non-tender when I danielle the eye lid  No foreign bodies noted  No Photophobia is present     There is no consensual photophobia present suggesting iritis  General: VS reviewed  Appears in NAD  awake, alert  Well-nourished, well-developed  Appears stated age  Speaking normally in full sentences  Head: Normocephalic, atraumatic  Eyes: EOM-I  No diplopia  No hyphema  No subconjunctival hemorrhages  Symmetrical lids  ENT: Atraumatic external nose and ears  MMM  No malocclusion  No stridor  Normal phonation  No drooling  Normal swallowing  Neck: No JVD  CV: No pallor noted  Lungs:   No tachypnea  No respiratory distress  Abd: soft nt nd no rebound/guarding  MSK:   FROM spontaneously  Skin: Dry, intact  Neuro: Awake, alert, GCS15, CN II-XII grossly intact  Motor grossly intact  Psychiatric/Behavioral: interacting normally; appropriate mood/affect   Exam: deferred    Vitals:    02/26/23 1005   BP: 161/91   BP Location: Right arm   Pulse: 78   Resp: 14   Temp: 98 2 °F (36 8 °C)   TempSrc: Oral   SpO2: 95%     - There are no obvious limitations to social determinants of care  - Nursing note reviewed  - Vitals reviewed  - Orders placed by myself and/or advanced practitioner / resident     - Previous chart was reviewed  - No language barrier    - History obtained from patient  - There are no limitations to the history obtained:     Past Medical:    has a past medical history of Acne, Anxiety (Na), and Rosacea  Past Surgical:    has no past surgical history on file      Social:     Social History     Substance and Sexual Activity   Alcohol Use Yes   • Alcohol/week: 0 0 standard drinks     Social History Tobacco Use   Smoking Status Former   • Packs/day: 0 50   • Years: 12 00   • Pack years: 6 00   • Types: Cigars, Cigarettes   • Quit date: 2009   • Years since quittin 1   Smokeless Tobacco Never     Social History     Substance and Sexual Activity   Drug Use Never       Echo:   No results found for this or any previous visit  No results found for this or any previous visit  Cath:    No results found for this or any previous visit  Code Status: No Order  Advance Directive and Living Will:      Power of :    POLST:    Medications   erythromycin (ILOTYCIN) 0 5 % ophthalmic ointment 0 5 inch (has no administration in time range)   tetracaine 0 5 % ophthalmic solution 2 drop (2 drops Right Eye Given by Other 23 1039)   fluorescein sodium sterile ophthalmic strip 1 strip (1 strip Right Eye Given by Other 23 1039)     No orders to display     Orders Placed This Encounter   Procedures   • Visual acuity screening     Labs Reviewed - No data to display  Time reflects when diagnosis was documented in both MDM as applicable and the Disposition within this note     Time User Action Codes Description Comment    2023 11:00 AM Nathan Smith Add [S05 01XA] Abrasion of right cornea, initial encounter       ED Disposition     ED Disposition   Discharge    Condition   Stable    Date/Time   Sun 2023 11:00 AM    Comment   Geofm Blades discharge to home/self care                 Follow-up Information     Follow up With Specialties Details Why Contact Info Additional Information    [facility] Emergency Department Emergency Medicine Go to  If symptoms worsen [address] Emergency Department,  [address], [place], [place], [address] [facility] Emergency Department Emergency Medicine Go to  If symptoms worsen [facility] [address] [phone] [facility] Emergency Department, [address], [place], [place], [address] [address]  [address] [facility]       Juan Adamson MD Ophthalmology Call  To make appointment for re-eval in 3-5 days [address]se[address] Andersolaf [address] [place] [address] 105  [phone]           Patient's Medications   Discharge Prescriptions    ERYTHROMYCIN (ILOTYCIN) OPHTHALMIC OINTMENT    Administer 0 5 inches to the right eye every 4 (four) hours       Start Date: 2/26/2023 End Date: --       Order Dose: 0 5 inches       Quantity: 3 5 g    Refills: 0    KETOROLAC (ACULAR) 0 5 % OPHTHALMIC SOLUTION    Administer 1 drop to the right eye every 6 (six) hours for 5 days       Start Date: 2/26/2023 End Date: 3/3/2023       Order Dose: 1 drop       Quantity: 1 mL    Refills: 0     No discharge procedures on file  Prior to Admission Medications   Prescriptions Last Dose Informant Patient Reported? Taking? DULoxetine (CYMBALTA) 60 mg delayed release capsule   No No   Sig: TAKE 1 CAPSULE BY MOUTH EVERY DAY   Sodium Fluoride 5000 PPM 1 1 % PSTE   Yes No   Sig: Take 1 application by mouth daily at bedtime   Patient not taking: Reported on 12/30/2022      Facility-Administered Medications: None                        Portions of the record may have been created with voice recognition software  Occasional wrong word or "sound a like" substitutions may have occurred due to the inherent limitations of voice recognition software  Read the chart carefully and recognize, using context, where substitutions have occurred      Electronically signed by:  Emre Ruano MD  02/26/23 110       Gaby Haines MD  02/26/23 1104

## 2023-04-05 ENCOUNTER — TELEMEDICINE (OUTPATIENT)
Dept: FAMILY MEDICINE CLINIC | Facility: CLINIC | Age: 43
End: 2023-04-05

## 2023-04-05 VITALS — BODY MASS INDEX: 31.71 KG/M2 | WEIGHT: 221 LBS

## 2023-04-05 DIAGNOSIS — F41.1 GENERALIZED ANXIETY DISORDER: Primary | ICD-10-CM

## 2023-04-05 DIAGNOSIS — Z13.220 SCREENING FOR LIPOID DISORDERS: ICD-10-CM

## 2023-04-05 NOTE — PROGRESS NOTES
Virtual Regular Visit    Verification of patient location:    Patient is located in the following state in which I hold an active license [place]      Assessment/Plan:    Problem List Items Addressed This Visit        Other    Generalized anxiety disorder - Primary  Pt is doing very well on cymbalta  Continue same  F/u 6 mo for physical and f/u    Relevant Orders    CBC and differential    Comprehensive metabolic panel    TSH, 3rd generation   Other Visit Diagnoses     Screening for lipoid disorders        Relevant Orders    Lipid panel               Reason for visit is   Chief Complaint   Patient presents with   • Follow-up     6 MONTH FOLLOW UP    • Virtual Regular Visit        Encounter provider Romayne Elam, DO    Provider located at [address] [place] [address] 200  [facility] Ramonita Abarca [address]  [phone]      Recent Visits  No visits were found meeting these conditions  Showing recent visits within past 7 days and meeting all other requirements  Today's Visits  Date Type Provider Dept   04/05/23 Telemedicine Romayne Elam, [facility] today's visits and meeting all other requirements  Future Appointments  No visits were found meeting these conditions  Showing future appointments within next 150 days and meeting all other requirements       The patient was identified by name and date of birth  Deirdre Maynard was informed that this is a telemedicine visit and that the visit is being conducted through the [employer]  He agrees to proceed     My office door was closed  No one else was in the room  He acknowledged consent and understanding of privacy and security of the video platform  The patient has agreed to participate and understands they can discontinue the visit at any time  Patient is aware this is a billable service  Subjective  Deirdre Maynard is a 43 y o  male who presents in f/u for anxiety  Pt is doing very well on cymbalta    Doesn't have increased irritability  Feels in control  No excessive worry  Feels like this is the right med for him  No sfx  Doesn't want to wean  No sadness, hopelessness, anhedonia, si/hi  Sleeping well  Wants to continue same      HPI     Past Medical History:   Diagnosis Date   • Acne    • Anxiety Na    Just a checkup  Can be tele health   • Rosacea        History reviewed  No pertinent surgical history  Current Outpatient Medications   Medication Sig Dispense Refill   • DULoxetine (CYMBALTA) 60 mg delayed release capsule TAKE 1 CAPSULE BY MOUTH EVERY DAY 90 capsule 2   • Sodium Fluoride 5000 PPM 1 1 % PSTE Take 1 application  by mouth daily at bedtime     • erythromycin (ILOTYCIN) ophthalmic ointment Administer 0 5 inches to the right eye every 4 (four) hours 3 5 g 0   • ketorolac (ACULAR) 0 5 % ophthalmic solution Administer 1 drop to the right eye every 6 (six) hours for 5 days 1 mL 0     No current facility-administered medications for this visit  No Known Allergies    Review of Systems  See hpi    Video Exam    Vitals:    04/05/23 1131   Weight: 100 kg (221 lb)       Physical Exam  Constitutional:       Appearance: Normal appearance  Pulmonary:      Effort: Pulmonary effort is normal  No respiratory distress  Neurological:      Mental Status: He is alert and oriented to person, place, and time  Cranial Nerves: No cranial nerve deficit  Psychiatric:         Mood and Affect: Mood normal          Behavior: Behavior normal          Thought Content:  Thought content normal          Judgment: Judgment normal           I spent 5 minutes directly with the patient during this visit

## 2023-05-18 ENCOUNTER — OFFICE VISIT (OUTPATIENT)
Dept: OBGYN CLINIC | Facility: CLINIC | Age: 43
End: 2023-05-18

## 2023-05-18 VITALS
DIASTOLIC BLOOD PRESSURE: 92 MMHG | WEIGHT: 221 LBS | HEART RATE: 73 BPM | HEIGHT: 70 IN | SYSTOLIC BLOOD PRESSURE: 145 MMHG | BODY MASS INDEX: 31.64 KG/M2

## 2023-05-18 DIAGNOSIS — M77.8 TENDINITIS OF RADIAL STYLOID: Primary | ICD-10-CM

## 2023-05-18 NOTE — PROGRESS NOTES
Assessment:    Left DeQuervain's tenosynovitis       Plan:  Discussed another CSI as he had a good positive response to the last   Discussed surgical intervention  Discussed perioperative and postoperative care    All of the risks and benefits of operative treatment were explained to the patient, as well as the risks and benefits of any alternative treatment options, including nonoperative care  This risks of surgery include, but are not limited to, infection, bleeding, blood clot, damage to nerves/arteries, need for further surgery, cardiovascular risk, anesthesia risk, and continued pain  The patient understood this and elects to proceed forward with surgical intervention  Left first dorsal compartment release  Consent obtained  Local    F/U Post op    Visit Diagnoses     Tendinitis of radial styloid                       Subjective:     HPI    Patient ID:  Dash Starks is a 43 y o  male presenting for follow up of the left wrist   Pt states he was feeling very good about 2 weeks after his last visit  He states the injection took all his pain way  He canceled his follow up appointment because he was pain free  About a week after that , he was without his brace for sleeping and he re-injured his thumb  He states his pain is now back to pre CSI levels  The following portions of the patient's history were reviewed and updated as appropriate: allergies, current medications, past family history, past medical history, past social history, past surgical history, and problem list     Review of Systems   Constitutional: Negative for chills and fever  HENT: Negative for ear pain and sore throat  Eyes: Negative for pain and visual disturbance  Respiratory: Negative for cough and shortness of breath  Cardiovascular: Negative for chest pain and palpitations  Gastrointestinal: Negative for abdominal pain and vomiting  Genitourinary: Negative for dysuria and hematuria     Musculoskeletal: Negative for arthralgias and back pain  Skin: Negative for color change and rash  Neurological: Negative for seizures and syncope  All other systems reviewed and are negative  Objective:    Imaging:   No images reviewed    Physical Exam     Vitals:    05/18/23 1140   BP: 145/92   Pulse: 73       General appearance:  NAD   Cardiac:  Regular rate  Lungs:  Unlabored breathing  Abdomen:  Non-distended    Orthopedic Examination:  Left wrist     Inspection: No open wounds or erythema  Mild swelling of the first dorsal compartment  No ecchymosis  No effusion of the wrist     Palpation: Tenderness to palpation first dorsal extensor compartment      Range-of-motion: Normal wrist range of motion, full composite fist formation    Sensation: Intact median radial ulnar nerve distribution      Procedures   No procedures performed

## 2023-05-18 NOTE — H&P (VIEW-ONLY)
Assessment:    Left DeQuervain's tenosynovitis       Plan:  Discussed another CSI as he had a good positive response to the last   Discussed surgical intervention  Discussed perioperative and postoperative care    All of the risks and benefits of operative treatment were explained to the patient, as well as the risks and benefits of any alternative treatment options, including nonoperative care  This risks of surgery include, but are not limited to, infection, bleeding, blood clot, damage to nerves/arteries, need for further surgery, cardiovascular risk, anesthesia risk, and continued pain  The patient understood this and elects to proceed forward with surgical intervention  Left first dorsal compartment release  Consent obtained  Local    F/U Post op    Visit Diagnoses     Tendinitis of radial styloid                       Subjective:     HPI    Patient ID:  Glen Fernandez is a 43 y o  male presenting for follow up of the left wrist   Pt states he was feeling very good about 2 weeks after his last visit  He states the injection took all his pain way  He canceled his follow up appointment because he was pain free  About a week after that , he was without his brace for sleeping and he re-injured his thumb  He states his pain is now back to pre CSI levels  The following portions of the patient's history were reviewed and updated as appropriate: allergies, current medications, past family history, past medical history, past social history, past surgical history, and problem list     Review of Systems   Constitutional: Negative for chills and fever  HENT: Negative for ear pain and sore throat  Eyes: Negative for pain and visual disturbance  Respiratory: Negative for cough and shortness of breath  Cardiovascular: Negative for chest pain and palpitations  Gastrointestinal: Negative for abdominal pain and vomiting  Genitourinary: Negative for dysuria and hematuria     Musculoskeletal: Negative for arthralgias and back pain  Skin: Negative for color change and rash  Neurological: Negative for seizures and syncope  All other systems reviewed and are negative  Objective:    Imaging:   No images reviewed    Physical Exam     Vitals:    05/18/23 1140   BP: 145/92   Pulse: 73       General appearance:  NAD   Cardiac:  Regular rate  Lungs:  Unlabored breathing  Abdomen:  Non-distended    Orthopedic Examination:  Left wrist     Inspection: No open wounds or erythema  Mild swelling of the first dorsal compartment  No ecchymosis  No effusion of the wrist     Palpation: Tenderness to palpation first dorsal extensor compartment      Range-of-motion: Normal wrist range of motion, full composite fist formation    Sensation: Intact median radial ulnar nerve distribution      Procedures   No procedures performed

## 2023-06-16 ENCOUNTER — HOSPITAL ENCOUNTER (OUTPATIENT)
Facility: HOSPITAL | Age: 43
Setting detail: OUTPATIENT SURGERY
Discharge: HOME | End: 2023-06-16
Attending: SURGERY | Admitting: SURGERY
Payer: COMMERCIAL

## 2023-06-16 VITALS
HEIGHT: 70 IN | TEMPERATURE: 97.8 F | OXYGEN SATURATION: 97 % | HEART RATE: 88 BPM | RESPIRATION RATE: 18 BRPM | BODY MASS INDEX: 31.56 KG/M2 | SYSTOLIC BLOOD PRESSURE: 157 MMHG | DIASTOLIC BLOOD PRESSURE: 83 MMHG | WEIGHT: 220.46 LBS

## 2023-06-16 DIAGNOSIS — Z48.89 AFTERCARE FOLLOWING SURGERY: Primary | ICD-10-CM

## 2023-06-16 PROCEDURE — 25000 INCISION OF TENDON SHEATH: CPT | Performed by: SURGERY

## 2023-06-16 RX ORDER — LIDOCAINE HYDROCHLORIDE AND EPINEPHRINE 10; 10 MG/ML; UG/ML
INJECTION, SOLUTION INFILTRATION; PERINEURAL AS NEEDED
Status: DISCONTINUED | OUTPATIENT
Start: 2023-06-16 | End: 2023-06-16 | Stop reason: HOSPADM

## 2023-06-16 RX ORDER — OXYCODONE HYDROCHLORIDE 5 MG/1
5 TABLET ORAL EVERY 4 HOURS PRN
Status: DISCONTINUED | OUTPATIENT
Start: 2023-06-16 | End: 2023-06-16 | Stop reason: HOSPADM

## 2023-06-16 RX ORDER — ACETAMINOPHEN 325 MG/1
650 TABLET ORAL EVERY 6 HOURS PRN
Status: DISCONTINUED | OUTPATIENT
Start: 2023-06-16 | End: 2023-06-16 | Stop reason: HOSPADM

## 2023-06-16 RX ORDER — BUPIVACAINE HYDROCHLORIDE AND EPINEPHRINE 2.5; 5 MG/ML; UG/ML
INJECTION, SOLUTION EPIDURAL; INFILTRATION; INTRACAUDAL; PERINEURAL AS NEEDED
Status: DISCONTINUED | OUTPATIENT
Start: 2023-06-16 | End: 2023-06-16 | Stop reason: HOSPADM

## 2023-06-16 RX ORDER — ACETAMINOPHEN AND CODEINE PHOSPHATE 300; 30 MG/1; MG/1
1 TABLET ORAL EVERY 12 HOURS PRN
Qty: 5 TABLET | Refills: 0 | Status: SHIPPED | OUTPATIENT
Start: 2023-06-16

## 2023-06-16 NOTE — DISCHARGE INSTR - AVS FIRST PAGE
Elevate hand above heart as much as possible to help pain and swelling  May use hand for simple tasks, but no heavy lifting or tight squeezing x 4 weeks  Keep operative bandage clean and dry  You may remove bandage in 4 days, just leave steri strips over incision  You are permitted to shower after 4 days with dressing off  Steri strips will fall off over the course of a few days  Perform simple finger motion exercises: opening & closing fingers 10 x every hr  Follow-up in the office 6/29/2023 per your scheduled appointment

## 2023-06-16 NOTE — INTERVAL H&P NOTE
H&P reviewed  After examining the patient I find no changes in the patients condition since the H&P had been written      Vitals:    06/16/23 0906   BP: 137/85   Pulse: 98   Resp: 18   Temp: (!) 97 1 °F (36 2 °C)   SpO2: 96%

## 2023-06-16 NOTE — OP NOTE
OPERATIVE REPORT  PATIENT NAME: Hannah Rodriguez    :  1980  MRN: [id number]  Pt Location: [facility] OR ROOM 07    SURGERY DATE: 2023    Surgeon(s) and Role:     Leticia Vasquez MD - Primary    Preop Diagnosis:  Tendinitis of radial styloid [M77 8]    Post-Op Diagnosis Codes:     * Tendinitis of radial styloid [M77 8]    Procedure(s):  Left - RELEASE DEQUERVAINS  LEFT    Specimen(s):  * No specimens in log *    Estimated Blood Loss:   Minimal    Drains:  * No LDAs found *    Anesthesia Type:   Local    Operative Indications:  Tendinitis of radial styloid [M77 8]      Operative Findings:  Two separate compartments comprising the first dorsal compartment    Complications:   None    Procedure and Technique: The left wrist was cleansed with alcohol  A field block was performed using lidocaine 1% and marcaine 0 25% with epinephrine  The left upper extremity was then prepped and draped in a sterile fashion  An incision was made over there first dorsal compartment in line with the first dorsal compartment starting from the radial styloid and extending proximally  Blunt dissection was performed down to the first dorsal compartment sheath while protecting the dorsal radial sensory nerve  The sheath was incised with a 15 blade scalpel, and then extended proximally and distally with tenotomy scissors  The tendons were retracted to allow inspection of the sheath  There were two completely separate compartments for APL and EPB  Both were released  The wound was irrigated with saliine, and approximated with 3-0 vicryl deep dermal sutures  Dermabond was applied followed by steri strips, 4x4 gauze, and an ace bandage  The patient was transferred to recovery in stable condition  I was present for the entire procedure      Patient Disposition:  PACU         SIGNATURE: Roseanne Santiago MD  DATE: 2023  TIME: 10:19 AM

## 2023-06-29 ENCOUNTER — OFFICE VISIT (OUTPATIENT)
Dept: OBGYN CLINIC | Facility: CLINIC | Age: 43
End: 2023-06-29

## 2023-06-29 VITALS
WEIGHT: 220 LBS | SYSTOLIC BLOOD PRESSURE: 158 MMHG | BODY MASS INDEX: 31.5 KG/M2 | HEART RATE: 105 BPM | HEIGHT: 70 IN | DIASTOLIC BLOOD PRESSURE: 108 MMHG

## 2023-06-29 DIAGNOSIS — M65.4 TENDINITIS, DE QUERVAIN'S: Primary | ICD-10-CM

## 2023-06-29 PROCEDURE — 99024 POSTOP FOLLOW-UP VISIT: CPT | Performed by: SURGERY

## 2023-06-29 NOTE — PROGRESS NOTES
PATIENT NAME: Khadar Sales    :  1980  MRN: [id number]   SURGERY DATE:   2023  PROCEDURE:   L DeQuervain's release         Subjective: Patient reports doing very well with complete resolution of preop symptoms  He is happy with the results of the procedure  He has had no issues with incision  No fever or chills    Objective:    Vitals:    23 1115   BP: (!) 158/108   Pulse: 105     Left wrist     Radial-based incision is clean dry intact, healed  There is no swelling or significant erythema  No wound drainage  Full composite fist formation, normal wrist range of motion  Sensation intact to light touch median radial ulnar nerve distribution  Palpable radial pulse    Assessment:  S/p L DeQuervain's release  2023  Doing very well with resolution of preoperative symptoms  Plan: Activities as tolerated, increase as able  No restrictions from our standpoint  Massage and moisturize the incisional area to soften up scar tissue over time  May follow-up on an as-needed basis with instructions to call the office with any questions or concerns

## 2023-06-30 ENCOUNTER — PATIENT MESSAGE (OUTPATIENT)
Dept: FAMILY MEDICINE CLINIC | Facility: CLINIC | Age: 43
End: 2023-06-30

## 2023-07-05 ENCOUNTER — OFFICE VISIT (OUTPATIENT)
Dept: FAMILY MEDICINE CLINIC | Facility: CLINIC | Age: 43
End: 2023-07-05
Payer: COMMERCIAL

## 2023-07-05 VITALS
HEIGHT: 70 IN | DIASTOLIC BLOOD PRESSURE: 98 MMHG | HEART RATE: 94 BPM | BODY MASS INDEX: 30.35 KG/M2 | RESPIRATION RATE: 18 BRPM | SYSTOLIC BLOOD PRESSURE: 144 MMHG | OXYGEN SATURATION: 99 % | WEIGHT: 212 LBS | TEMPERATURE: 97.8 F

## 2023-07-05 DIAGNOSIS — Z13.1 SCREENING FOR DIABETES MELLITUS (DM): ICD-10-CM

## 2023-07-05 DIAGNOSIS — E22.2 SIADH (SYNDROME OF INAPPROPRIATE ADH PRODUCTION) (HCC): ICD-10-CM

## 2023-07-05 DIAGNOSIS — I10 ESSENTIAL HYPERTENSION: Primary | ICD-10-CM

## 2023-07-05 PROCEDURE — 3725F SCREEN DEPRESSION PERFORMED: CPT | Performed by: FAMILY MEDICINE

## 2023-07-05 PROCEDURE — 99214 OFFICE O/P EST MOD 30 MIN: CPT | Performed by: FAMILY MEDICINE

## 2023-07-05 PROCEDURE — 93000 ELECTROCARDIOGRAM COMPLETE: CPT | Performed by: FAMILY MEDICINE

## 2023-07-05 RX ORDER — LISINOPRIL 10 MG/1
10 TABLET ORAL DAILY
Qty: 30 TABLET | Refills: 3 | Status: SHIPPED | OUTPATIENT
Start: 2023-07-05

## 2023-07-05 NOTE — PROGRESS NOTES
Name: Osorio Hernandez      : 1980      MRN: [id number]  Encounter Provider: Leydi Coker DO  Encounter Date: 2023   Encounter department: [address]     1. Essential hypertension  Comments:  bps c/w HTN  start lisinopril daily  obtain previously-ordered labs  f/u 1 mo  ekg shows nsr with sinus arrhythmia at 74 bpm. nml axis. nml ekg  Orders:  -     POCT ECG  -     lisinopril (ZESTRIL) 10 mg tablet; Take 1 tablet (10 mg total) by mouth daily    2. SIADH (syndrome of inappropriate ADH production) ([facility])  Comments:  hx of same. no longer active    3. Screening for diabetes mellitus (DM)  -     Hemoglobin A1C; Future           Subjective      HPI   Pt presents in routine f/u for blood pressure. Pressures at home have been elevated over the last month. He has not obtained labs. No chest pain, shortness of breath, n/v, abd pain, visual changes, paresthesias, weakness. Review of Systems  See hpi; all other systems negative    Current Outpatient Medications on File Prior to Visit   Medication Sig   • DULoxetine (CYMBALTA) 60 mg delayed release capsule TAKE 1 CAPSULE BY MOUTH EVERY DAY   • Sodium Fluoride 5000 PPM 1.1 % PSTE Take 1 application. by mouth daily at bedtime   • acetaminophen-codeine (TYLENOL/CODEINE #3) 300-30 MG per tablet Take 1 tablet by mouth every 12 (twelve) hours as needed for moderate pain or severe pain (Patient not taking: Reported on 2023)       Objective     /98   Pulse 94   Temp 97.8 °F (36.6 °C)   Resp 18   Ht 5' 10" (1.778 m)   Wt 96.2 kg (212 lb)   SpO2 99%   BMI 30.42 kg/m²     Physical Exam  Constitutional:       General: He is not in acute distress. Appearance: He is well-developed. HENT:      Head: Normocephalic and atraumatic.       Right Ear: Tympanic membrane, ear canal and external ear normal.      Left Ear: Tympanic membrane, ear canal and external ear normal.      Nose: Nose normal. Mouth/Throat:      Pharynx: No oropharyngeal exudate. Eyes:      Conjunctiva/sclera: Conjunctivae normal.      Pupils: Pupils are equal, round, and reactive to light. Neck:      Thyroid: No thyromegaly. Vascular: No carotid bruit or JVD. Cardiovascular:      Rate and Rhythm: Regular rhythm. Heart sounds: S1 normal and S2 normal. No murmur heard. No friction rub. No gallop. No S3 or S4 sounds. Pulmonary:      Effort: Pulmonary effort is normal.      Breath sounds: Normal breath sounds. No wheezing, rhonchi or rales. Abdominal:      General: Bowel sounds are normal. There is no distension. Palpations: Abdomen is soft. Tenderness: There is no abdominal tenderness. Lymphadenopathy:      Cervical: No cervical adenopathy. Neurological:      Mental Status: He is alert and oriented to person, place, and time. Cranial Nerves: No cranial nerve deficit. Sensory: No sensory deficit. Deep Tendon Reflexes: Reflexes are normal and symmetric.        Samella Reap, DO

## 2023-07-07 ENCOUNTER — APPOINTMENT (OUTPATIENT)
Dept: LAB | Facility: CLINIC | Age: 43
End: 2023-07-07
Payer: COMMERCIAL

## 2023-07-07 DIAGNOSIS — Z13.1 SCREENING FOR DIABETES MELLITUS (DM): ICD-10-CM

## 2023-07-07 PROCEDURE — 36415 COLL VENOUS BLD VENIPUNCTURE: CPT

## 2023-07-07 PROCEDURE — 83036 HEMOGLOBIN GLYCOSYLATED A1C: CPT

## 2023-07-10 DIAGNOSIS — F17.200 CURRENT SMOKER: Primary | ICD-10-CM

## 2023-07-10 LAB
EST. AVERAGE GLUCOSE BLD GHB EST-MCNC: 97 MG/DL
HBA1C MFR BLD: 5 %

## 2023-07-10 RX ORDER — VARENICLINE TARTRATE 25 MG
KIT ORAL
Qty: 53 EACH | Refills: 0 | Status: SHIPPED | OUTPATIENT
Start: 2023-07-10 | End: 2023-08-07

## 2023-07-28 DIAGNOSIS — I10 ESSENTIAL HYPERTENSION: ICD-10-CM

## 2023-07-28 RX ORDER — LISINOPRIL 10 MG/1
10 TABLET ORAL DAILY
Qty: 90 TABLET | Refills: 2 | Status: SHIPPED | OUTPATIENT
Start: 2023-07-28

## 2023-07-31 ENCOUNTER — APPOINTMENT (OUTPATIENT)
Dept: LAB | Facility: CLINIC | Age: 43
End: 2023-07-31
Payer: COMMERCIAL

## 2023-07-31 ENCOUNTER — TELEPHONE (OUTPATIENT)
Dept: FAMILY MEDICINE CLINIC | Facility: CLINIC | Age: 43
End: 2023-07-31

## 2023-07-31 DIAGNOSIS — F41.1 GENERALIZED ANXIETY DISORDER: ICD-10-CM

## 2023-07-31 DIAGNOSIS — F41.1 GENERALIZED ANXIETY DISORDER: Primary | ICD-10-CM

## 2023-07-31 LAB
ALBUMIN SERPL BCP-MCNC: 4.8 G/DL (ref 3.5–5)
ALP SERPL-CCNC: 52 U/L (ref 34–104)
ALT SERPL W P-5'-P-CCNC: 23 U/L (ref 7–52)
ANION GAP SERPL CALCULATED.3IONS-SCNC: 8 MMOL/L
AST SERPL W P-5'-P-CCNC: 29 U/L (ref 13–39)
BASOPHILS # BLD AUTO: 0.06 THOUSANDS/ÂΜL (ref 0–0.1)
BASOPHILS NFR BLD AUTO: 1 % (ref 0–1)
BILIRUB SERPL-MCNC: 0.66 MG/DL (ref 0.2–1)
BUN SERPL-MCNC: 7 MG/DL (ref 5–25)
CALCIUM SERPL-MCNC: 9.5 MG/DL (ref 8.4–10.2)
CHLORIDE SERPL-SCNC: 96 MMOL/L (ref 96–108)
CHOLEST SERPL-MCNC: 234 MG/DL
CO2 SERPL-SCNC: 27 MMOL/L (ref 21–32)
CREAT SERPL-MCNC: 0.69 MG/DL (ref 0.6–1.3)
EOSINOPHIL # BLD AUTO: 0.13 THOUSAND/ÂΜL (ref 0–0.61)
EOSINOPHIL NFR BLD AUTO: 2 % (ref 0–6)
ERYTHROCYTE [DISTWIDTH] IN BLOOD BY AUTOMATED COUNT: 12.1 % (ref 11.6–15.1)
GFR SERPL CREATININE-BSD FRML MDRD: 117 ML/MIN/1.73SQ M
GLUCOSE P FAST SERPL-MCNC: 85 MG/DL (ref 65–99)
HCT VFR BLD AUTO: 48.2 % (ref 36.5–49.3)
HDLC SERPL-MCNC: 63 MG/DL
HGB BLD-MCNC: 17 G/DL (ref 12–17)
IMM GRANULOCYTES # BLD AUTO: 0.03 THOUSAND/UL (ref 0–0.2)
IMM GRANULOCYTES NFR BLD AUTO: 0 % (ref 0–2)
LDLC SERPL CALC-MCNC: 154 MG/DL (ref 0–100)
LYMPHOCYTES # BLD AUTO: 1.17 THOUSANDS/ÂΜL (ref 0.6–4.47)
LYMPHOCYTES NFR BLD AUTO: 14 % (ref 14–44)
MCH RBC QN AUTO: 32.6 PG (ref 26.8–34.3)
MCHC RBC AUTO-ENTMCNC: 35.3 G/DL (ref 31.4–37.4)
MCV RBC AUTO: 92 FL (ref 82–98)
MONOCYTES # BLD AUTO: 0.91 THOUSAND/ÂΜL (ref 0.17–1.22)
MONOCYTES NFR BLD AUTO: 11 % (ref 4–12)
NEUTROPHILS # BLD AUTO: 6.09 THOUSANDS/ÂΜL (ref 1.85–7.62)
NEUTS SEG NFR BLD AUTO: 72 % (ref 43–75)
NONHDLC SERPL-MCNC: 171 MG/DL
NRBC BLD AUTO-RTO: 0 /100 WBCS
PLATELET # BLD AUTO: 283 THOUSANDS/UL (ref 149–390)
PMV BLD AUTO: 8.7 FL (ref 8.9–12.7)
POTASSIUM SERPL-SCNC: 5 MMOL/L (ref 3.5–5.3)
PROT SERPL-MCNC: 7.8 G/DL (ref 6.4–8.4)
RBC # BLD AUTO: 5.22 MILLION/UL (ref 3.88–5.62)
SODIUM SERPL-SCNC: 131 MMOL/L (ref 135–147)
TRIGL SERPL-MCNC: 87 MG/DL
TSH SERPL DL<=0.05 MIU/L-ACNC: 1.12 UIU/ML (ref 0.45–4.5)
WBC # BLD AUTO: 8.39 THOUSAND/UL (ref 4.31–10.16)

## 2023-07-31 PROCEDURE — 84443 ASSAY THYROID STIM HORMONE: CPT

## 2023-07-31 PROCEDURE — 36415 COLL VENOUS BLD VENIPUNCTURE: CPT

## 2023-07-31 PROCEDURE — 80053 COMPREHEN METABOLIC PANEL: CPT

## 2023-07-31 PROCEDURE — 85025 COMPLETE CBC W/AUTO DIFF WBC: CPT

## 2023-07-31 PROCEDURE — 80061 LIPID PANEL: CPT

## 2023-07-31 NOTE — TELEPHONE ENCOUNTER
Patient in the office wants lab reorder for appt this went.  Spoke to Dr Cheri aguila to reorder labs

## 2023-08-02 ENCOUNTER — OFFICE VISIT (OUTPATIENT)
Dept: FAMILY MEDICINE CLINIC | Facility: CLINIC | Age: 43
End: 2023-08-02
Payer: COMMERCIAL

## 2023-08-02 VITALS
RESPIRATION RATE: 20 BRPM | SYSTOLIC BLOOD PRESSURE: 126 MMHG | DIASTOLIC BLOOD PRESSURE: 84 MMHG | WEIGHT: 212.6 LBS | OXYGEN SATURATION: 95 % | HEART RATE: 86 BPM | BODY MASS INDEX: 30.43 KG/M2 | HEIGHT: 70 IN

## 2023-08-02 DIAGNOSIS — I10 ESSENTIAL HYPERTENSION: Primary | ICD-10-CM

## 2023-08-02 DIAGNOSIS — E22.2 SIADH (SYNDROME OF INAPPROPRIATE ADH PRODUCTION) (HCC): ICD-10-CM

## 2023-08-02 DIAGNOSIS — M54.41 ACUTE RIGHT-SIDED LOW BACK PAIN WITH RIGHT-SIDED SCIATICA: ICD-10-CM

## 2023-08-02 PROCEDURE — 99214 OFFICE O/P EST MOD 30 MIN: CPT | Performed by: FAMILY MEDICINE

## 2023-08-02 RX ORDER — PREDNISONE 50 MG/1
50 TABLET ORAL DAILY
Qty: 5 TABLET | Refills: 0 | Status: SHIPPED | OUTPATIENT
Start: 2023-08-02

## 2023-08-02 NOTE — PROGRESS NOTES
Name: Milton Berumen      : 1980      MRN: [id number]  Encounter Provider: Sujey Kiran DO  Encounter Date: 2023   Encounter department: [address]     1. Essential hypertension  Comments:  improved on lisinopril  continue same  f/u 3 mo and bring in machine    2. Acute right-sided low back pain with right-sided sciatica  Comments:  continue with chiropractor  prednisone 50mg daily x 5 days  call if sx don't resolve  Orders:  -     predniSONE 50 mg tablet; Take 1 tablet (50 mg total) by mouth daily    3. SIADH (syndrome of inappropriate ADH production) ([facility])  Comments:  hx of same  recheck non-fasting sodium in 1 mo  Orders:  -     Basic metabolic panel; Future      BMI Counseling: Body mass index is 30.5 kg/m². The BMI is above normal. Nutrition recommendations include encouraging healthy choices of fruits and vegetables. Exercise recommendations include vigorous physical activity 75 minutes/week. Rationale for BMI follow-up plan is due to patient being overweight or obese. Subjective      HPI   Pt presents in f/u for HTN. Started lisinopril at the last visit. Tolerating well. No cough. No chest pain, shortness of breath, n/v, abd pain, visual changes, paresthesias, weakness.     Most recent labs show:    Lab Results   Component Value Date    SODIUM 131 (L) 2023    K 5.0 2023    CL 96 2023    CO2 27 2023    AGAP 8 2023    BUN 7 2023    CREATININE 0.69 2023    GLUC 94 2019    GLUF 85 2023    CALCIUM 9.5 2023    AST 29 2023    ALT 23 2023    ALKPHOS 52 2023    TP 7.8 2023    TBILI 0.66 2023    EGFR 117 2023     Lab Results   Component Value Date    HGBA1C 5.0 2023     Lab Results   Component Value Date    CHOLESTEROL 234 (H) 2023    CHOLESTEROL 247 (H) 2022    CHOLESTEROL 229 (H) 2019     Lab Results   Component Value Date    HDL 63 07/31/2023    HDL 46 07/02/2022    HDL 58 09/20/2019     Lab Results   Component Value Date    TRIG 87 07/31/2023    TRIG 226 (H) 07/02/2022    TRIG 78 09/20/2019     Lab Results   Component Value Date    NONHDLC 171 07/31/2023    [address] 201 07/02/2022    [address] 171 09/20/2019     Lab Results   Component Value Date    LDLCALC 154 (H) 07/31/2023     Lab Results   Component Value Date    WBC 8.39 07/31/2023    HGB 17.0 07/31/2023    HCT 48.2 07/31/2023    MCV 92 07/31/2023     07/31/2023     Lab Results   Component Value Date    [id number] 1.119 07/31/2023     Has had occasional hyponatremia in the past.  Will need to recheck. Has been on cymbalta. Newly on chantix. Pt also c/o back pain. [place] [employer] [place] and [place]. Caught himself awkwardly. Didn't hurt at first, but by the end of the day, he had such tightness he could hardly walk. Couldn't get out of bed Monday. Saw chiropractor when he finally did which was helpful. Woke yesterday with low back pain down the R leg. Has taken nothing for symptoms. Noted that medical marijuana has been really helpful along with his cymbalta. Denies anxiety, irritability, depression. Feels well. Review of Systems   Constitutional: Negative for chills, fatigue, fever and unexpected weight change. HENT: Negative for congestion, ear pain, hearing loss, postnasal drip, rhinorrhea, sinus pressure, sinus pain, sore throat, trouble swallowing and voice change. Eyes: Negative for pain, redness and visual disturbance. Respiratory: Negative for cough and shortness of breath. Cardiovascular: Negative for chest pain, palpitations and leg swelling. Gastrointestinal: Negative for abdominal pain, constipation, diarrhea and nausea. Endocrine: Negative for cold intolerance, heat intolerance, polydipsia and polyuria. Genitourinary: Negative for dysuria, frequency and urgency. Musculoskeletal: Positive for back pain.  Negative for arthralgias, joint swelling and myalgias. Skin: Negative for rash. No suspicious lesions   Neurological: Positive for numbness. Negative for weakness and headaches. Hematological: Negative for adenopathy. Current Outpatient Medications on File Prior to Visit   Medication Sig   • DULoxetine (CYMBALTA) 60 mg delayed release capsule TAKE 1 CAPSULE BY MOUTH EVERY DAY   • lisinopril (ZESTRIL) 10 mg tablet TAKE 1 TABLET BY MOUTH EVERY DAY   • Sodium Fluoride 5000 PPM 1.1 % PSTE Take 1 application. by mouth daily at bedtime   • varenicline 0.5 MG X 11 & 1 MG X 42 tablet therapy pack Take 0.5 mg by mouth daily for 3 days, THEN 0.5 mg 2 (two) times a day for 4 days, THEN 1 mg 2 (two) times a day for 21 days. • acetaminophen-codeine (TYLENOL/CODEINE #3) 300-30 MG per tablet Take 1 tablet by mouth every 12 (twelve) hours as needed for moderate pain or severe pain       Objective     /84 (BP Location: Left arm, Patient Position: Sitting, Cuff Size: Large)   Pulse 86   Resp 20   Ht 5' 10" (1.778 m)   Wt 96.4 kg (212 lb 9.6 oz)   SpO2 95%   BMI 30.50 kg/m²     Physical Exam  Constitutional:       General: He is not in acute distress. Appearance: He is well-developed. HENT:      Head: Normocephalic and atraumatic. Right Ear: Tympanic membrane, ear canal and external ear normal.      Left Ear: Tympanic membrane, ear canal and external ear normal.      Nose: Nose normal.      Mouth/Throat:      Pharynx: No oropharyngeal exudate. Eyes:      Conjunctiva/sclera: Conjunctivae normal.      Pupils: Pupils are equal, round, and reactive to light. Neck:      Thyroid: No thyromegaly. Vascular: No carotid bruit or JVD. Cardiovascular:      Rate and Rhythm: Regular rhythm. Heart sounds: S1 normal and S2 normal. No murmur heard. No friction rub. No gallop. No S3 or S4 sounds. Pulmonary:      Effort: Pulmonary effort is normal.      Breath sounds: Normal breath sounds.  No wheezing, rhonchi or rales. Abdominal:      General: Bowel sounds are normal. There is no distension. Palpations: Abdomen is soft. Tenderness: There is no abdominal tenderness. Lymphadenopathy:      Cervical: No cervical adenopathy. Neurological:      Mental Status: He is alert and oriented to person, place, and time. Cranial Nerves: No cranial nerve deficit. Sensory: No sensory deficit. Deep Tendon Reflexes: Reflexes are normal and symmetric. Psychiatric:         Mood and Affect: Mood normal.         Behavior: Behavior normal.         Thought Content:  Thought content normal.         Judgment: Judgment normal.       Ramin Sanchez, DO

## 2023-11-10 ENCOUNTER — OFFICE VISIT (OUTPATIENT)
Dept: FAMILY MEDICINE CLINIC | Facility: CLINIC | Age: 43
End: 2023-11-10
Payer: COMMERCIAL

## 2023-11-10 ENCOUNTER — APPOINTMENT (OUTPATIENT)
Dept: LAB | Facility: CLINIC | Age: 43
End: 2023-11-10
Payer: COMMERCIAL

## 2023-11-10 VITALS
HEIGHT: 70 IN | RESPIRATION RATE: 18 BRPM | TEMPERATURE: 97.7 F | WEIGHT: 226.2 LBS | SYSTOLIC BLOOD PRESSURE: 122 MMHG | DIASTOLIC BLOOD PRESSURE: 82 MMHG | OXYGEN SATURATION: 97 % | HEART RATE: 95 BPM | BODY MASS INDEX: 32.38 KG/M2

## 2023-11-10 DIAGNOSIS — E87.1 HYPONATREMIA: Primary | ICD-10-CM

## 2023-11-10 DIAGNOSIS — Z23 ENCOUNTER FOR IMMUNIZATION: ICD-10-CM

## 2023-11-10 DIAGNOSIS — E22.2 SIADH (SYNDROME OF INAPPROPRIATE ADH PRODUCTION) (HCC): ICD-10-CM

## 2023-11-10 DIAGNOSIS — I10 ESSENTIAL HYPERTENSION: Primary | ICD-10-CM

## 2023-11-10 DIAGNOSIS — F33.1 MODERATE EPISODE OF RECURRENT MAJOR DEPRESSIVE DISORDER (HCC): ICD-10-CM

## 2023-11-10 LAB
ANION GAP SERPL CALCULATED.3IONS-SCNC: 5 MMOL/L
BUN SERPL-MCNC: 7 MG/DL (ref 5–25)
CALCIUM SERPL-MCNC: 9.5 MG/DL (ref 8.4–10.2)
CHLORIDE SERPL-SCNC: 97 MMOL/L (ref 96–108)
CO2 SERPL-SCNC: 28 MMOL/L (ref 21–32)
CREAT SERPL-MCNC: 0.81 MG/DL (ref 0.6–1.3)
GFR SERPL CREATININE-BSD FRML MDRD: 108 ML/MIN/1.73SQ M
GLUCOSE SERPL-MCNC: 93 MG/DL (ref 65–140)
POTASSIUM SERPL-SCNC: 4.4 MMOL/L (ref 3.5–5.3)
SODIUM SERPL-SCNC: 130 MMOL/L (ref 135–147)

## 2023-11-10 PROCEDURE — 99213 OFFICE O/P EST LOW 20 MIN: CPT | Performed by: FAMILY MEDICINE

## 2023-11-10 PROCEDURE — 90471 IMMUNIZATION ADMIN: CPT

## 2023-11-10 PROCEDURE — 80048 BASIC METABOLIC PNL TOTAL CA: CPT

## 2023-11-10 PROCEDURE — 90686 IIV4 VACC NO PRSV 0.5 ML IM: CPT

## 2023-11-10 PROCEDURE — 36415 COLL VENOUS BLD VENIPUNCTURE: CPT

## 2023-11-10 NOTE — PROGRESS NOTES
Name: Donnell Hutchinson      : 1980      MRN: [id number]  Encounter Provider: Jocelynn Momin DO  Encounter Date: 11/10/2023   Encounter department: [address]     1. Essential hypertension  Comments:  well-controlled on current meds  continue same  recheck 6 mo    2. Moderate episode of recurrent major depressive disorder ([address])  Comments:  stable  continue current meds    3. SIADH (syndrome of inappropriate ADH production) ([address])  Comments:  obtain bmp    4. Encounter for immunization  -     influenza vaccine, quadrivalent, 0.5 mL, preservative-free, for adult and pediatric patients 6 mos+ (AFLURIA, FLUARIX, FLULAVAL, FLUZONE)           Subjective      HPI  Pt presents in f/u. Blood pressure today 122/82. Getting some high diastolics at home, but nml today. No chest pain, shortness of breath, n/v, abd pain, visual changes, paresthesias, weakness. No cough. Due for flu shot    Due for repeat bmp. Will obtain after visit. Mood stable on cymbalta. Review of Systems  See hpi    Current Outpatient Medications on File Prior to Visit   Medication Sig    DULoxetine (CYMBALTA) 60 mg delayed release capsule TAKE 1 CAPSULE BY MOUTH EVERY DAY    lisinopril (ZESTRIL) 10 mg tablet TAKE 1 TABLET BY MOUTH EVERY DAY    Sodium Fluoride 5000 PPM 1.1 % PSTE Take 1 application. by mouth daily at bedtime    [DISCONTINUED] predniSONE 50 mg tablet Take 1 tablet (50 mg total) by mouth daily    [DISCONTINUED] acetaminophen-codeine (TYLENOL/CODEINE #3) 300-30 MG per tablet Take 1 tablet by mouth every 12 (twelve) hours as needed for moderate pain or severe pain    [DISCONTINUED] varenicline 0.5 MG X 11 & 1 MG X 42 tablet therapy pack Take 0.5 mg by mouth daily for 3 days, THEN 0.5 mg 2 (two) times a day for 4 days, THEN 1 mg 2 (two) times a day for 21 days.        Objective     /82   Pulse 95   Temp 97.7 °F (36.5 °C) (Temporal)   Resp 18   Ht 5' 10" (1.778 m)   Wt 103 kg (226 lb 3.2 oz)   SpO2 97%   BMI 32.46 kg/m²     Physical Exam  Constitutional:       General: He is not in acute distress. Appearance: Normal appearance. He is well-developed. HENT:      Head: Normocephalic and atraumatic. Right Ear: Tympanic membrane, ear canal and external ear normal.      Left Ear: Tympanic membrane, ear canal and external ear normal.      Nose: Nose normal.      Mouth/Throat:      Mouth: Mucous membranes are moist.      Pharynx: Oropharynx is clear. No posterior oropharyngeal erythema. Eyes:      Extraocular Movements: Extraocular movements intact. Conjunctiva/sclera: Conjunctivae normal.      Pupils: Pupils are equal, round, and reactive to light. Neck:      Thyroid: No thyromegaly. Vascular: No carotid bruit or JVD. Cardiovascular:      Rate and Rhythm: Normal rate and regular rhythm. Heart sounds: S1 normal and S2 normal. No murmur heard. No friction rub. No gallop. No S3 or S4 sounds. Pulmonary:      Effort: Pulmonary effort is normal.      Breath sounds: Normal breath sounds. No wheezing, rhonchi or rales. Abdominal:      General: Bowel sounds are normal. There is no distension. Palpations: Abdomen is soft. Tenderness: There is no abdominal tenderness. Musculoskeletal:      Cervical back: Neck supple. Lymphadenopathy:      Cervical: No cervical adenopathy. Neurological:      General: No focal deficit present. Mental Status: He is alert and oriented to person, place, and time. Cranial Nerves: No cranial nerve deficit. Sensory: No sensory deficit. Deep Tendon Reflexes: Reflexes are normal and symmetric.  Reflexes normal.       Bobbi Santoyo DO

## 2023-11-14 ENCOUNTER — TELEPHONE (OUTPATIENT)
Dept: NEPHROLOGY | Facility: CLINIC | Age: 43
End: 2023-11-14

## 2023-12-26 ENCOUNTER — TELEPHONE (OUTPATIENT)
Age: 43
End: 2023-12-26

## 2023-12-26 NOTE — TELEPHONE ENCOUNTER
12/26/23  Screened by: Adriana Cassidy    Referring Provider     Pre- Screening:     There is no height or weight on file to calculate BMI.  Has patient been referred for a routine screening Colonoscopy? yes  Is the patient between 45-75 years old? no      Previous Colonoscopy yes   If yes:    Date:     Facility:     Reason:       SCHEDULING STAFF: If the patient is between 45yrs-49yrs, please advise patient to confirm benefits/coverage with their insurance company for a routine screening colonoscopy, some insurance carriers will only cover at 50yrs or older. If the patient is over 75years old, please schedule an office visit.     Does the patient want to see a Gastroenterologist prior to their procedure OR are they having any GI symptoms? no    Has the patient been hospitalized or had abdominal surgery in the past 6 months? no    Does the patient use supplemental oxygen? no    Does the patient take Coumadin, Lovenox, Plavix, Elliquis, Xarelto, or other blood thinning medication? no    Has the patient had a stroke, cardiac event, or stent placed in the past year? no    SCHEDULING STAFF: If patient answers NO to above questions, then schedule procedure. If patient answers YES to above questions, then schedule office appointment.     If patient is between 45yrs - 49yrs, please advise patient that we will have to confirm benefits & coverage with their insurance company for a routine screening colonoscopy.

## 2024-04-21 DIAGNOSIS — F41.1 GENERALIZED ANXIETY DISORDER: ICD-10-CM

## 2024-04-22 RX ORDER — DULOXETIN HYDROCHLORIDE 60 MG/1
CAPSULE, DELAYED RELEASE ORAL
Qty: 90 CAPSULE | Refills: 1 | Status: SHIPPED | OUTPATIENT
Start: 2024-04-22

## 2024-05-08 DIAGNOSIS — I10 ESSENTIAL HYPERTENSION: ICD-10-CM

## 2024-05-08 RX ORDER — LISINOPRIL 10 MG/1
10 TABLET ORAL DAILY
Qty: 90 TABLET | Refills: 1 | Status: SHIPPED | OUTPATIENT
Start: 2024-05-08

## 2024-05-17 ENCOUNTER — PREP FOR PROCEDURE (OUTPATIENT)
Dept: GASTROENTEROLOGY | Facility: CLINIC | Age: 44
End: 2024-05-17

## 2024-05-17 ENCOUNTER — TELEPHONE (OUTPATIENT)
Dept: GASTROENTEROLOGY | Facility: CLINIC | Age: 44
End: 2024-05-17

## 2024-05-17 ENCOUNTER — OFFICE VISIT (OUTPATIENT)
Dept: GASTROENTEROLOGY | Facility: CLINIC | Age: 44
End: 2024-05-17
Payer: COMMERCIAL

## 2024-05-17 VITALS
WEIGHT: 215 LBS | DIASTOLIC BLOOD PRESSURE: 75 MMHG | HEIGHT: 70 IN | SYSTOLIC BLOOD PRESSURE: 114 MMHG | BODY MASS INDEX: 30.78 KG/M2 | HEART RATE: 80 BPM

## 2024-05-17 DIAGNOSIS — Z80.0 FAMILY HX OF COLON CANCER: ICD-10-CM

## 2024-05-17 DIAGNOSIS — Z86.010 HX OF ADENOMATOUS COLONIC POLYPS: Primary | ICD-10-CM

## 2024-05-17 DIAGNOSIS — Z00.00 PHYSICAL EXAM, ANNUAL: Primary | ICD-10-CM

## 2024-05-17 PROBLEM — Z86.0100 HX OF COLONIC POLYPS: Status: ACTIVE | Noted: 2024-05-17

## 2024-05-17 PROCEDURE — 99203 OFFICE O/P NEW LOW 30 MIN: CPT | Performed by: NURSE PRACTITIONER

## 2024-05-17 NOTE — TELEPHONE ENCOUNTER
Scheduled date of colonoscopy (as of today):7/26/24  Physician performing colonoscopy:Dr Hickman   Location of colonoscopy:   Bowel prep reviewed with patient:miralax   Instructions reviewed with patient by:sb  Clearances: none

## 2024-05-17 NOTE — PROGRESS NOTES
[facility] - Outpatient Consultation  Brody Morrissey 43 y.o. male MRN: [id number]  Encounter: [id number]          ASSESSMENT AND PLAN:      1. Hx of  adenomatous colonic polyps  2. Family hx of colon cancer  Personal history of colon polyps. Family history of colon cancer maternal grandmother at age of diagnosis unknown.Colonoscopy done12/13/2019 showed single piece polypectomy was performed. Polyp was completely removed.  Otherwise normal mucosa noted in whole colon.  No rectal masses. Recommendations were to repeat colonoscopy in 3 years.  Biopsy showed tubular adenoma, no high-grade dysplasia or malignancy.  - Colonoscopy; Future  - MiraLAX and Dulcolax bowel prep  -I obtained informed consent from the patient. The risks/benefits/alternatives of the procedure were discussed with the patient. Risks included, but not limited to, infection, bleeding, perforation, injury to organs in the abdomen, missed lesion and incomplete procedure were discussed. Patient was agreeable and electronic signature was obtained.       ______________________________________________________________________    HPI: Brody Morrissey is a 43-year-old male with past medical history of anxiety, DUTCH, Rosacea and colon polyps who presents to the office as new patient for family history of colon cancer, family history of colonic polyps, and personal history of adenomatous colon polyps.  Patient currently denies any GI issues.  Patient denies nausea, vomiting, acid reflux, heartburn, epigastric or abdominal pain.  Patient denies blood in stool, blood from rectal area, or black tarry stool.  Bowel patterns are regular.  Abdomen exam benign, no abdominal tenderness or guarding.  Reviewed recent lab work.  Also reviewed CMP done 7/31 which show LFTs within normal limits.    Colonoscopy done12/13/2019 showed single piece polypectomy was performed.  Polyp was completely removed.  Otherwise normal mucosa noted in whole  colon.  No rectal masses.  Recommendations were to repeat colonoscopy in 3 years.  Biopsy showed tubular adenoma, no high-grade dysplasia or malignancy.  Patient quit smoking August 2023.  Patient has previous history of heavy alcohol use 12 beers daily.  Patient reported that he quit drinking August 2023.  Denies any illicit drug or marijuana use.  Positive family history colon cancer maternal grandmother age of diagnosis unknown.  Positive family history of colon polyps brothers and cousins.      REVIEW OF SYSTEMS:    CONSTITUTIONAL: Denies any fever, chills, rigors, and weight loss.  HEENT: No earache or tinnitus. Denies hearing loss or visual disturbances.  CARDIOVASCULAR: No chest pain or palpitations.   RESPIRATORY: Denies any cough, hemoptysis, shortness of breath or dyspnea on exertion.  GASTROINTESTINAL: As noted in the History of Present Illness.   GENITOURINARY: No problems with urination. Denies any hematuria or dysuria.  NEUROLOGIC: No dizziness or vertigo, denies headaches.   MUSCULOSKELETAL: Denies any muscle or joint pain.   SKIN: Denies skin rashes or itching.   ENDOCRINE: Denies excessive thirst. Denies intolerance to heat or cold.  PSYCHOSOCIAL: Denies depression or anxiety. Denies any recent memory loss.       Historical Information   Past Medical History:   Diagnosis Date    Acne     Anxiety Na    Just a checkup.  Can be Turbocoating    De Quervain's tenosynovitis     release today 6/16/2023    Rosacea      Past Surgical History:   Procedure Laterality Date    DC INCISION EXTENSOR TENDON SHEATH WRIST Left 6/16/2023    Procedure: RELEASE DEQUERVAINS, LEFT;  Surgeon: Mendez Seay MD;  Location: [facility];  Service: Orthopedics     Social History   Social History     Substance and Sexual Activity   Alcohol Use Not Currently     Social History     Substance and Sexual Activity   Drug Use Not Currently    Types: Marijuana    Comment: medical marijuana  LD  6/15/2023  PM     Social History     Tobacco  "Use   Smoking Status Former    Current packs/day: 0.00    Average packs/day: 0.5 packs/day for 2.6 years (1.3 ttl pk-yrs)    Types: Cigarettes    Start date:     Quit date: 2023    Years since quittin.7   Smokeless Tobacco Never     Family History   Problem Relation Age of Onset    Ulcers Mother     Uterine cancer Mother     GI problems Mother     Atrial fibrillation Mother     Hypertension Father     Diabetes Father     Colon polyps Brother     Cancer Brother         Pre cancerous polyps    Colon cancer Maternal Grandmother         Colon cancer    Cancer Maternal Grandmother         Colon cancer    Alzheimer's disease Maternal Grandfather     Breast cancer Paternal Grandmother     No Known Problems Paternal Grandfather     Colon polyps Cousin     Atrial fibrillation Sister 48    Cancer Cousin         Pre cancerous polyps       Meds/Allergies       Current Outpatient Medications:     DULoxetine (CYMBALTA) 60 mg delayed release capsule    lisinopril (ZESTRIL) 10 mg tablet    Sodium Fluoride 5000 PPM 1.1 % PSTE    No Known Allergies        Objective     Blood pressure 114/75, pulse 80, height 5' 10\" (1.778 m), weight 97.5 kg (215 lb). Body mass index is 30.85 kg/m².        PHYSICAL EXAM:      General Appearance:   Alert, cooperative, no distress   HEENT:   Normocephalic, atraumatic, anicteric.     Neck:  Supple, symmetrical, trachea midline   Lungs:   Clear to auscultation bilaterally; no rales, rhonchi or wheezing; respirations unlabored    Heart::   Regular rate and rhythm; no murmur, rub, or gallop.   Abdomen:   Soft, non-tender, non-distended; normal bowel sounds; no masses, no organomegaly    Genitalia:   Deferred    Rectal:   Deferred    Extremities:  No cyanosis, clubbing or edema    Pulses:  2+ and symmetric    Skin:  No jaundice, rashes, or lesions    Lymph nodes:  No palpable cervical lymphadenopathy        Lab Results:   No visits with results within 1 Day(s) from this visit.   Latest known " visit with results is:   Appointment on 11/10/2023   Component Date Value    Sodium 11/10/2023 130 (L)     Potassium 11/10/2023 4.4     Chloride 11/10/2023 97     CO2 11/10/2023 28     ANION GAP 11/10/2023 5     BUN 11/10/2023 7     Creatinine 11/10/2023 0.81     Glucose 11/10/2023 93     Calcium 11/10/2023 9.5     eGFR 11/10/2023 108          Radiology Results:   No results found.

## 2024-05-23 ENCOUNTER — APPOINTMENT (OUTPATIENT)
Dept: LAB | Facility: CLINIC | Age: 44
End: 2024-05-23
Payer: COMMERCIAL

## 2024-05-23 DIAGNOSIS — Z00.00 PHYSICAL EXAM, ANNUAL: ICD-10-CM

## 2024-05-23 LAB
ALBUMIN SERPL BCP-MCNC: 4.1 G/DL (ref 3.5–5)
ALP SERPL-CCNC: 44 U/L (ref 34–104)
ALT SERPL W P-5'-P-CCNC: 15 U/L (ref 7–52)
ANION GAP SERPL CALCULATED.3IONS-SCNC: 5 MMOL/L (ref 4–13)
AST SERPL W P-5'-P-CCNC: 16 U/L (ref 13–39)
BASOPHILS # BLD AUTO: 0.03 THOUSANDS/ÂΜL (ref 0–0.1)
BASOPHILS NFR BLD AUTO: 1 % (ref 0–1)
BILIRUB SERPL-MCNC: 0.46 MG/DL (ref 0.2–1)
BUN SERPL-MCNC: 7 MG/DL (ref 5–25)
CALCIUM SERPL-MCNC: 9.2 MG/DL (ref 8.4–10.2)
CHLORIDE SERPL-SCNC: 104 MMOL/L (ref 96–108)
CHOLEST SERPL-MCNC: 183 MG/DL
CO2 SERPL-SCNC: 27 MMOL/L (ref 21–32)
CREAT SERPL-MCNC: 0.86 MG/DL (ref 0.6–1.3)
EOSINOPHIL # BLD AUTO: 0.22 THOUSAND/ÂΜL (ref 0–0.61)
EOSINOPHIL NFR BLD AUTO: 5 % (ref 0–6)
ERYTHROCYTE [DISTWIDTH] IN BLOOD BY AUTOMATED COUNT: 11.8 % (ref 11.6–15.1)
EST. AVERAGE GLUCOSE BLD GHB EST-MCNC: 111 MG/DL
GFR SERPL CREATININE-BSD FRML MDRD: 106 ML/MIN/1.73SQ M
GLUCOSE P FAST SERPL-MCNC: 101 MG/DL (ref 65–99)
HBA1C MFR BLD: 5.5 %
HCT VFR BLD AUTO: 39.4 % (ref 36.5–49.3)
HDLC SERPL-MCNC: 35 MG/DL
HGB BLD-MCNC: 13.3 G/DL (ref 12–17)
IMM GRANULOCYTES # BLD AUTO: 0.01 THOUSAND/UL (ref 0–0.2)
IMM GRANULOCYTES NFR BLD AUTO: 0 % (ref 0–2)
LDLC SERPL CALC-MCNC: 123 MG/DL (ref 0–100)
LYMPHOCYTES # BLD AUTO: 1.57 THOUSANDS/ÂΜL (ref 0.6–4.47)
LYMPHOCYTES NFR BLD AUTO: 33 % (ref 14–44)
MCH RBC QN AUTO: 31.6 PG (ref 26.8–34.3)
MCHC RBC AUTO-ENTMCNC: 33.8 G/DL (ref 31.4–37.4)
MCV RBC AUTO: 94 FL (ref 82–98)
MONOCYTES # BLD AUTO: 0.69 THOUSAND/ÂΜL (ref 0.17–1.22)
MONOCYTES NFR BLD AUTO: 15 % (ref 4–12)
NEUTROPHILS # BLD AUTO: 2.24 THOUSANDS/ÂΜL (ref 1.85–7.62)
NEUTS SEG NFR BLD AUTO: 46 % (ref 43–75)
NONHDLC SERPL-MCNC: 148 MG/DL
NRBC BLD AUTO-RTO: 0 /100 WBCS
PLATELET # BLD AUTO: 276 THOUSANDS/UL (ref 149–390)
PMV BLD AUTO: 9.2 FL (ref 8.9–12.7)
POTASSIUM SERPL-SCNC: 4.1 MMOL/L (ref 3.5–5.3)
PROT SERPL-MCNC: 6.6 G/DL (ref 6.4–8.4)
RBC # BLD AUTO: 4.21 MILLION/UL (ref 3.88–5.62)
SODIUM SERPL-SCNC: 136 MMOL/L (ref 135–147)
TRIGL SERPL-MCNC: 127 MG/DL
TSH SERPL DL<=0.05 MIU/L-ACNC: 2.23 UIU/ML (ref 0.45–4.5)
WBC # BLD AUTO: 4.76 THOUSAND/UL (ref 4.31–10.16)

## 2024-05-23 PROCEDURE — 83036 HEMOGLOBIN GLYCOSYLATED A1C: CPT

## 2024-05-23 PROCEDURE — 84443 ASSAY THYROID STIM HORMONE: CPT

## 2024-05-23 PROCEDURE — 80061 LIPID PANEL: CPT

## 2024-05-23 PROCEDURE — 80053 COMPREHEN METABOLIC PANEL: CPT

## 2024-05-23 PROCEDURE — 36415 COLL VENOUS BLD VENIPUNCTURE: CPT

## 2024-05-23 PROCEDURE — 85025 COMPLETE CBC W/AUTO DIFF WBC: CPT

## 2024-05-29 ENCOUNTER — OFFICE VISIT (OUTPATIENT)
Dept: FAMILY MEDICINE CLINIC | Facility: CLINIC | Age: 44
End: 2024-05-29
Payer: COMMERCIAL

## 2024-05-29 VITALS
WEIGHT: 213 LBS | HEIGHT: 70 IN | OXYGEN SATURATION: 97 % | RESPIRATION RATE: 16 BRPM | SYSTOLIC BLOOD PRESSURE: 114 MMHG | DIASTOLIC BLOOD PRESSURE: 76 MMHG | BODY MASS INDEX: 30.49 KG/M2 | HEART RATE: 72 BPM

## 2024-05-29 DIAGNOSIS — F41.1 GENERALIZED ANXIETY DISORDER: ICD-10-CM

## 2024-05-29 DIAGNOSIS — E87.1 HYPONATREMIA: ICD-10-CM

## 2024-05-29 DIAGNOSIS — L71.9 ROSACEA: ICD-10-CM

## 2024-05-29 DIAGNOSIS — I10 ESSENTIAL HYPERTENSION: ICD-10-CM

## 2024-05-29 DIAGNOSIS — Z00.00 PHYSICAL EXAM, ANNUAL: Primary | ICD-10-CM

## 2024-05-29 DIAGNOSIS — Z87.898 HISTORY OF ALCOHOL USE DISORDER: ICD-10-CM

## 2024-05-29 PROCEDURE — 99214 OFFICE O/P EST MOD 30 MIN: CPT | Performed by: FAMILY MEDICINE

## 2024-05-29 PROCEDURE — 99396 PREV VISIT EST AGE 40-64: CPT | Performed by: FAMILY MEDICINE

## 2024-05-29 RX ORDER — METRONIDAZOLE 10 MG/G
GEL TOPICAL DAILY
Qty: 60 G | Refills: 1 | Status: SHIPPED | OUTPATIENT
Start: 2024-05-29

## 2024-05-29 NOTE — PATIENT INSTRUCTIONS
Continue amazing effort with sobriety  Agree with stopping lisinopril  Continue cymbalta  Metronidazole gel daily until rosacea improves.  If it doesn't over the next two weeks, let me know

## 2024-05-31 NOTE — PROGRESS NOTES
Ambulatory Visit  Name: Brody Morrissey      : 1980      MRN: [id number]  Encounter Provider: Abimbola Tinajero DO  Encounter Date: 2024   Encounter department: [facility]    Assessment & Plan   1. Physical exam, annual  Comments:  preventively up to date  continue healthy diet and exercise  labs look great  2. Rosacea  Comments:  topical metronidazole daily  call if this is unhelpful  Orders:  -     metroNIDAZOLE (METROGEL) 1 % gel; Apply topically daily  3. History of alcohol use disorder  Comments:  congratulated pt on hard work on sobriety  continue meetings and healthy lifestyle  4. Generalized anxiety disorder  Comments:  well-controlled on current meds  cotinue same  5. Essential hypertension  Comments:  no need for meds with weight loss and etoh cessation   well done!  6. Hyponatremia  Comments:  resolved  likely due to etoh intake       History of Present Illness     HPI  Pt presents for physical.  Most recent labs show:  Lab Results   Component Value Date    SODIUM 136 2024    K 4.1 2024     2024    CO2 27 2024    AGAP 5 2024    BUN 7 2024    CREATININE 0.86 2024    GLUC 93 11/10/2023    GLUF 101 (H) 2024    CALCIUM 9.2 2024    AST 16 2024    ALT 15 2024    ALKPHOS 44 2024    TP 6.6 2024    TBILI 0.46 2024    EGFR 106 2024     Lab Results   Component Value Date    HGBA1C 5.5 2024     Lab Results   Component Value Date    [id number] 2.234 2024     Lab Results   Component Value Date    WBC 4.76 2024    HGB 13.3 2024    HCT 39.4 2024    MCV 94 2024     2024     Pt is up to date on imm's.  He is losing weight and exercising.      From a problem standpoint:  pt stopped his lisinopril because blood pressure was running low and he was getting dizzy.  Blood pressure appropriate today.    The biggest change for patient is that he has  "stopped drinking.  Went to [facility] for inpt treatment.  Did iop.  Going to meetings and has been maintaining sobriety.  Sodium has normalized.    Pt's rosacea flaring.      Anxiety is well-controlled.  Feeling well on cymbalta    Review of Systems   Constitutional:  Negative for chills, fatigue, fever and unexpected weight change.   HENT:  Negative for congestion, ear pain, hearing loss, postnasal drip, rhinorrhea, sinus pressure, sinus pain, sore throat, trouble swallowing and voice change.    Eyes:  Negative for pain, redness and visual disturbance.   Respiratory:  Negative for cough and shortness of breath.    Cardiovascular:  Negative for chest pain, palpitations and leg swelling.   Gastrointestinal:  Negative for abdominal pain, constipation, diarrhea and nausea.   Endocrine: Negative for cold intolerance, heat intolerance, polydipsia and polyuria.   Genitourinary:  Negative for dysuria, frequency and urgency.   Musculoskeletal:  Negative for arthralgias, joint swelling and myalgias.   Skin:  Positive for rash.        No suspicious lesions   Neurological:  Negative for weakness, numbness and headaches.   Hematological:  Negative for adenopathy.       Objective     /76   Pulse 72   Resp 16   Ht 5' 10\" (1.778 m)   Wt 96.6 kg (213 lb)   SpO2 97%   BMI 30.56 kg/m²     Physical Exam  Constitutional:       Appearance: Normal appearance.   HENT:      Head: Normocephalic and atraumatic.      Right Ear: Tympanic membrane, ear canal and external ear normal.      Left Ear: Tympanic membrane, ear canal and external ear normal.      Nose: Nose normal. No congestion.      Mouth/Throat:      Mouth: Mucous membranes are moist.      Pharynx: No oropharyngeal exudate or posterior oropharyngeal erythema.   Eyes:      Extraocular Movements: Extraocular movements intact.      Conjunctiva/sclera: Conjunctivae normal.      Pupils: Pupils are equal, round, and reactive to light.   Neck:      Vascular: No carotid " bruit.   Cardiovascular:      Rate and Rhythm: Normal rate and regular rhythm.      Heart sounds: No murmur heard.     No friction rub. No gallop.   Pulmonary:      Effort: Pulmonary effort is normal.      Breath sounds: No wheezing, rhonchi or rales.   Abdominal:      General: Abdomen is flat. There is no distension.      Palpations: Abdomen is soft.      Tenderness: There is no abdominal tenderness.   Musculoskeletal:      Cervical back: Neck supple.   Lymphadenopathy:      Cervical: No cervical adenopathy.   Skin:     Comments: +moderate rosacea   Neurological:      General: No focal deficit present.      Mental Status: He is alert and oriented to person, place, and time.      Cranial Nerves: No cranial nerve deficit.      Motor: No weakness.      Deep Tendon Reflexes: Reflexes normal.       Administrative Statements

## 2024-07-20 ENCOUNTER — OFFICE VISIT (OUTPATIENT)
Dept: OBGYN CLINIC | Facility: CLINIC | Age: 44
End: 2024-07-20
Payer: COMMERCIAL

## 2024-07-20 VITALS
WEIGHT: 201 LBS | HEIGHT: 70 IN | HEART RATE: 73 BPM | SYSTOLIC BLOOD PRESSURE: 115 MMHG | BODY MASS INDEX: 28.77 KG/M2 | DIASTOLIC BLOOD PRESSURE: 73 MMHG

## 2024-07-20 DIAGNOSIS — M65.4 DE QUERVAIN'S TENOSYNOVITIS, RIGHT: ICD-10-CM

## 2024-07-20 DIAGNOSIS — M25.531 PAIN IN RIGHT WRIST: Primary | ICD-10-CM

## 2024-07-20 PROCEDURE — 99213 OFFICE O/P EST LOW 20 MIN: CPT | Performed by: PHYSICIAN ASSISTANT

## 2024-07-20 PROCEDURE — 20550 NJX 1 TENDON SHEATH/LIGAMENT: CPT | Performed by: PHYSICIAN ASSISTANT

## 2024-07-20 RX ORDER — TRIAMCINOLONE ACETONIDE 40 MG/ML
40 INJECTION, SUSPENSION INTRA-ARTICULAR; INTRAMUSCULAR
Status: COMPLETED | OUTPATIENT
Start: 2024-07-20 | End: 2024-07-20

## 2024-07-20 RX ORDER — LIDOCAINE HYDROCHLORIDE 10 MG/ML
0.5 INJECTION, SOLUTION INFILTRATION; PERINEURAL
Status: COMPLETED | OUTPATIENT
Start: 2024-07-20 | End: 2024-07-20

## 2024-07-20 RX ADMIN — LIDOCAINE HYDROCHLORIDE 0.5 ML: 10 INJECTION, SOLUTION INFILTRATION; PERINEURAL at 07:00

## 2024-07-20 RX ADMIN — TRIAMCINOLONE ACETONIDE 40 MG: 40 INJECTION, SUSPENSION INTRA-ARTICULAR; INTRAMUSCULAR at 07:00

## 2024-07-20 NOTE — PATIENT INSTRUCTIONS
"Patient Education     de Quervain tendinopathy   The Basics   Written by the doctors and editors at [facility]   What is de Quervain tendinopathy? -- de Quervain tendinopathy is a condition that causes pain in the thumb and wrist. It is caused by a problem with a tendon. Tendons are strong bands of tissue that connect muscles to bones. de Quervain tendinopathy is sometimes called \"de Quervain tenosynovitis.\"  de Quervain tendinopathy involves tendons that connect the forearm muscles to the thumb. These tendons and the covering around them get inflamed. This causes symptoms.  Most often, de Quervain tendinopathy happens when people use their wrist and thumb too much in certain ways. This includes gripping or grabbing objects (like a tool, golf club, or tennis racket) over and over. But, it can also happen to people for no obvious reason.  What are the symptoms of de Quervain tendinopathy? -- Symptoms include:   Pain in the wrist or thumb   Trouble gripping objects   Swelling in the wrist  Will I need tests? -- Probably not. Your doctor can usually tell if you have de Quervain tendinopathy by learning about your symptoms and doing an exam. During the exam, they will carefully check your thumb, hand, and wrist.  How is de Quervain tendinopathy treated? -- Treatment includes:   Resting your thumb - To avoid moving your thumb, you can wear a splint made for keeping the thumb still.   Ice - You can put a cold gel pack, bag of ice, or bag of frozen vegetables on the painful or swollen area every 4 to 6 hours, for 15 minutes each time.   Pain-relieving medicines called \"NSAIDs\" - NSAIDs are a large group of medicines that includes ibuprofen (sample brand names: Advil, Motrin) and naproxen (sample brand name: Aleve).   Exercises - After your symptoms improve, your doctor or nurse will show you exercises to help your wrist and thumb move more easily.  If your symptoms don't get better with treatment, your doctor might recommend " other treatments. These can include:   Getting a shot of a steroid medicine around the tendon in your wrist - This can help with pain.   Surgery to cut or loosen the covering around the tendon  All topics are updated as new evidence becomes available and our peer review process is complete.  This topic retrieved from peerTransfer on: Feb 26, 2024.  Topic 36198 Version 12.0  Release: 32.2.4 - C32.56  © 2024 UpToDate, Inc. and/or its affiliates. All rights reserved.  Consumer Information Use and Disclaimer   Disclaimer: This generalized information is a limited summary of diagnosis, treatment, and/or medication information. It is not meant to be comprehensive and should be used as a tool to help the user understand and/or assess potential diagnostic and treatment options. It does NOT include all information about conditions, treatments, medications, side effects, or risks that may apply to a specific patient. It is not intended to be medical advice or a substitute for the medical advice, diagnosis, or treatment of a health care provider based on the health care provider's examination and assessment of a patient's specific and unique circumstances. Patients must speak with a health care provider for complete information about their health, medical questions, and treatment options, including any risks or benefits regarding use of medications. This information does not endorse any treatments or medications as safe, effective, or approved for treating a specific patient. UpToDate, Inc. and its affiliates disclaim any warranty or liability relating to this information or the use thereof.The use of this information is governed by the Terms of Use, available at https://www.wolterskluwer.com/en/know/clinical-effectiveness-terms. 2024© UpToDate, Inc. and its affiliates and/or licensors. All rights reserved.  Copyright   © 2024 UpToDate, Inc. and/or its affiliates. All rights reserved.

## 2024-07-20 NOTE — PROGRESS NOTES
Orthopaedic Surgery - Office Note  Brody Morrissey (43 y.o. male)   : 1980   MRN: [id number]  Encounter Date: 2024    Chief Complaint   Patient presents with    Right Wrist - Pain         Assessment/Plan  Diagnoses and all orders for this visit:    Pain in right wrist  -     Ambulatory Referral to Occupational Therapy; Future    De Quervain's tenosynovitis, right  -     Ambulatory Referral to Occupational Therapy; Future    Other orders  -     Hand/upper extremity injection: R extensor compartment 1    The diagnosis as well as treatment options were reviewed with the patient in the office today.  I would recommend icing the wrist 20 minutes on 1 hour off 3 times a day.  I recommend an oral anti-inflammatory such as Aleve 1 tablet twice daily with food stopping and calling if any stomach upset occurs.  He will continue use of the thumb spica brace, especially at night.  I would recommend occupational therapy evaluation and treatment geared towards a home exercise program.  The risks and benefits of a cortisone injection were reviewed in the office today.  Shared decision making was utilized and elected to proceed with the injection tolerating it well.  He did have significant decrease in pain prior to leaving the exam room today in the office.  All question concerns were answered in the office today.  We reviewed we could consider a second injection however if he had incomplete relief of pain with conservative treatment and wished to have surgery he should schedule with Dr. Seay who is operated on his contralateral side.     Return if symptoms worsen or fail to improve.        History of Present Illness  This is a previous patient here with new right wrist pain.  He reports without injury he began to develop symptoms in the first radial compartment.  He had similar symptoms on the contralateral side in the past resulting in a de Quervain's tenosynovitis surgery with Dr. Guzman.  He reports he has self  "started a thumb spica brace.  He reports the pain has persisted.  He would like to consider trying an injection and avoid surgery if at all possible at this point.  No paresthesias or injury are noted.  He reports picking up his daughter is difficult    Review of Systems  Pertinent items are noted in HPI.  All other systems were reviewed and are negative.    Physical Exam  /73 (BP Location: Left arm, Patient Position: Sitting, Cuff Size: Standard)   Pulse 73   Ht 5' 10\" (1.778 m)   Wt 91.2 kg (201 lb)   BMI 28.84 kg/m²   Cons: Appears well.  No apparent distress.  Psych: Alert. Oriented x3.  Mood and affect normal.    Right wrist is with a positive Finkelstein's test.  He is tender to palpation in the radial compartment.  There is no anatomical snuffbox tenderness.  He has full active and passive range of motion of the wrist.  He has full painful range of motion of the thumb.  There are no trigger fingers or Dupuytren's contractures.  There is no thenar atrophy or wasting.  There is no skin breakdown lesion or signs of infection.  Distal radial and ulnar pulses are +2        Studies Reviewed  X-rays not indicated at this time    Hand/upper extremity injection: R extensor compartment 1  Universal Protocol:  Consent: Verbal consent obtained.  Risks and benefits: risks, benefits and alternatives were discussed  Consent given by: patient  Time out: Immediately prior to procedure a \"time out\" was called to verify the correct patient, procedure, equipment, support staff and site/side marked as required.  Patient understanding: patient states understanding of the procedure being performed  Patient consent: the patient's understanding of the procedure matches consent given  Relevant documents: relevant documents present and verified  Test results: test results available and properly labeled  Site marked: the operative site was marked  Radiology Images displayed and confirmed. If images not available, report " reviewed: imaging studies available  Patient identity confirmed: verbally with patient  Supporting Documentation  Indications: pain   Procedure Details  Condition:de Quervain's tenosynovitis Site: R extensor compartment 1   Preparation: Patient was prepped and draped in the usual sterile fashion  Needle size: 22 G  Ultrasound guidance: no  Medications administered: 0.5 mL lidocaine 1 %; 40 mg triamcinolone acetonide 40 mg/mL  Patient tolerance: patient tolerated the procedure well with no immediate complications  Dressing:  Sterile dressing applied         Medical, Surgical, Family, and Social History  The patient's medical history, family history, and social history, were reviewed and updated as appropriate.    Past Medical History:   Diagnosis Date    Acne     Anxiety Na    Just a checkup.  Can be Humouno    De Quervain's tenosynovitis     release today 6/16/2023    Rosacea        Past Surgical History:   Procedure Laterality Date    KS INCISION EXTENSOR TENDON SHEATH WRIST Left 6/16/2023    Procedure: RELEASE DEQUERVAINS, LEFT;  Surgeon: Mendez Seay MD;  Location: [facility];  Service: Orthopedics       Family History   Problem Relation Age of Onset    Ulcers Mother     Uterine cancer Mother     GI problems Mother     Atrial fibrillation Mother     Hypertension Father     Diabetes Father     Colon polyps Brother     Cancer Brother         Pre cancerous polyps    Colon cancer Maternal Grandmother         Colon cancer    Cancer Maternal Grandmother         Colon cancer    Alzheimer's disease Maternal Grandfather     Breast cancer Paternal Grandmother     No Known Problems Paternal Grandfather     Colon polyps Cousin     Atrial fibrillation Sister 48    Cancer Cousin         Pre cancerous polyps       Social History     Occupational History    Not on file   Tobacco Use    Smoking status: Former     Current packs/day: 0.00     Average packs/day: 0.5 packs/day for 2.6 years (1.3 ttl pk-yrs)     Types: Cigarettes      Start date:      Quit date: 2023     Years since quittin.9    Smokeless tobacco: Never   Vaping Use    Vaping status: Never Used   Substance and Sexual Activity    Alcohol use: Not Currently     Comment: Last use 2024    Drug use: Not Currently     Types: Marijuana     Comment: Last use 2024    Sexual activity: Yes     Partners: Female     Birth control/protection: None       No Known Allergies      Current Outpatient Medications:     DULoxetine (CYMBALTA) 60 mg delayed release capsule, TAKE 1 CAPSULE BY MOUTH EVERY DAY, Disp: 90 capsule, Rfl: 1    metroNIDAZOLE (METROGEL) 1 % gel, Apply topically daily, Disp: 60 g, Rfl: 1    Sodium Fluoride 5000 PPM 1.1 % PSTE, Take 1 application. by mouth daily at bedtime, Disp: , Rfl:       Osorio Lo PA-C

## 2024-07-26 ENCOUNTER — ANESTHESIA (OUTPATIENT)
Dept: GASTROENTEROLOGY | Facility: HOSPITAL | Age: 44
End: 2024-07-26

## 2024-07-26 ENCOUNTER — HOSPITAL ENCOUNTER (OUTPATIENT)
Dept: GASTROENTEROLOGY | Facility: HOSPITAL | Age: 44
Setting detail: OUTPATIENT SURGERY
End: 2024-07-26
Payer: COMMERCIAL

## 2024-07-26 ENCOUNTER — ANESTHESIA EVENT (OUTPATIENT)
Dept: GASTROENTEROLOGY | Facility: HOSPITAL | Age: 44
End: 2024-07-26

## 2024-07-26 VITALS
WEIGHT: 206 LBS | SYSTOLIC BLOOD PRESSURE: 141 MMHG | OXYGEN SATURATION: 98 % | TEMPERATURE: 98.6 F | DIASTOLIC BLOOD PRESSURE: 85 MMHG | RESPIRATION RATE: 15 BRPM | HEART RATE: 73 BPM | HEIGHT: 70 IN | BODY MASS INDEX: 29.49 KG/M2

## 2024-07-26 DIAGNOSIS — Z86.010 HX OF ADENOMATOUS COLONIC POLYPS: ICD-10-CM

## 2024-07-26 PROCEDURE — 88305 TISSUE EXAM BY PATHOLOGIST: CPT | Performed by: PATHOLOGY

## 2024-07-26 PROCEDURE — 45380 COLONOSCOPY AND BIOPSY: CPT | Performed by: INTERNAL MEDICINE

## 2024-07-26 PROCEDURE — 45385 COLONOSCOPY W/LESION REMOVAL: CPT | Performed by: INTERNAL MEDICINE

## 2024-07-26 RX ORDER — LIDOCAINE HYDROCHLORIDE 10 MG/ML
INJECTION, SOLUTION EPIDURAL; INFILTRATION; INTRACAUDAL; PERINEURAL AS NEEDED
Status: DISCONTINUED | OUTPATIENT
Start: 2024-07-26 | End: 2024-07-26

## 2024-07-26 RX ORDER — SODIUM CHLORIDE, SODIUM LACTATE, POTASSIUM CHLORIDE, CALCIUM CHLORIDE 600; 310; 30; 20 MG/100ML; MG/100ML; MG/100ML; MG/100ML
125 INJECTION, SOLUTION INTRAVENOUS CONTINUOUS
Status: DISCONTINUED | OUTPATIENT
Start: 2024-07-26 | End: 2024-07-30 | Stop reason: HOSPADM

## 2024-07-26 RX ORDER — SODIUM CHLORIDE, SODIUM LACTATE, POTASSIUM CHLORIDE, CALCIUM CHLORIDE 600; 310; 30; 20 MG/100ML; MG/100ML; MG/100ML; MG/100ML
INJECTION, SOLUTION INTRAVENOUS CONTINUOUS PRN
Status: DISCONTINUED | OUTPATIENT
Start: 2024-07-26 | End: 2024-07-26

## 2024-07-26 RX ORDER — PROPOFOL 10 MG/ML
INJECTION, EMULSION INTRAVENOUS AS NEEDED
Status: DISCONTINUED | OUTPATIENT
Start: 2024-07-26 | End: 2024-07-26

## 2024-07-26 RX ADMIN — PROPOFOL 50 MG: 10 INJECTION, EMULSION INTRAVENOUS at 13:44

## 2024-07-26 RX ADMIN — PROPOFOL 50 MG: 10 INJECTION, EMULSION INTRAVENOUS at 13:50

## 2024-07-26 RX ADMIN — SODIUM CHLORIDE, SODIUM LACTATE, POTASSIUM CHLORIDE, AND CALCIUM CHLORIDE: .6; .31; .03; .02 INJECTION, SOLUTION INTRAVENOUS at 13:34

## 2024-07-26 RX ADMIN — PROPOFOL 50 MG: 10 INJECTION, EMULSION INTRAVENOUS at 13:42

## 2024-07-26 RX ADMIN — PROPOFOL 50 MG: 10 INJECTION, EMULSION INTRAVENOUS at 13:48

## 2024-07-26 RX ADMIN — PROPOFOL 50 MG: 10 INJECTION, EMULSION INTRAVENOUS at 13:41

## 2024-07-26 RX ADMIN — PROPOFOL 100 MG: 10 INJECTION, EMULSION INTRAVENOUS at 13:38

## 2024-07-26 RX ADMIN — LIDOCAINE HYDROCHLORIDE 50 MG: 10 INJECTION, SOLUTION EPIDURAL; INFILTRATION; INTRACAUDAL; PERINEURAL at 13:38

## 2024-07-26 RX ADMIN — PROPOFOL 50 MG: 10 INJECTION, EMULSION INTRAVENOUS at 13:46

## 2024-07-26 NOTE — ANESTHESIA PREPROCEDURE EVALUATION
Procedure:  COLONOSCOPY    Relevant Problems   CARDIO   (+) Mixed hyperlipidemia      NEURO/PSYCH   (+) Generalized anxiety disorder      PULMONARY   (+) DUTCH (obstructive sleep apnea)        Physical Exam    Airway    Mallampati score: II  TM Distance: >3 FB  Neck ROM: full     Dental   No notable dental hx     Cardiovascular      Pulmonary      Other Findings        Anesthesia Plan  ASA Score- 2     Anesthesia Type- IV sedation with anesthesia with ASA Monitors.         Additional Monitors:     Airway Plan:            Plan Factors-Exercise tolerance (METS): >4 METS.    Chart reviewed.    Patient summary reviewed.    Patient is not a current smoker.      There is medical exclusion for perioperative obstructive sleep apnea risk education.        Induction- intravenous.    Postoperative Plan-         Informed Consent- Anesthetic plan and risks discussed with patient.  I personally reviewed this patient with the CRNA. Discussed and agreed on the Anesthesia Plan with the CRNA..

## 2024-07-26 NOTE — ANESTHESIA POSTPROCEDURE EVALUATION
Post-Op Assessment Note    CV Status:  Stable  Pain Score: 0    Pain management: adequate       Mental Status:  Arousable   Hydration Status:  Stable   PONV Controlled:  Controlled   Airway Patency:  Patent  Two or more mitigation strategies used for obstructive sleep apnea   Post Op Vitals Reviewed: Yes    No anethesia notable event occurred.    Staff: CRNA               BP   103/56   Temp      Pulse 72   Resp 16   SpO2 97

## 2024-07-26 NOTE — H&P
History and Physical - [facility]  Brody Morrissey 43 y.o. male MRN: [id number]                  HPI: Brody Morrissey is a 43 y.o. year old male who presents for surveillance of large colon adenoma 2019      REVIEW OF SYSTEMS: Per the HPI, and otherwise unremarkable.    Historical Information   Past Medical History:   Diagnosis Date    Acne     Anxiety Na    Just a checkup.  Can be [employer] [facility]    De Quervain's tenosynovitis     release today 2023    Rosacea      Past Surgical History:   Procedure Laterality Date    CO INCISION EXTENSOR TENDON SHEATH WRIST Left 2023    Procedure: RELEASE DEQUERVAINS, LEFT;  Surgeon: Mendez Seay MD;  Location: [facility];  Service: Orthopedics     Social History   Social History     Substance and Sexual Activity   Alcohol Use Not Currently    Comment: Last use 2024     Social History     Substance and Sexual Activity   Drug Use Not Currently    Types: Marijuana    Comment: Last use 2024     Social History     Tobacco Use   Smoking Status Former    Current packs/day: 0.00    Average packs/day: 0.5 packs/day for 2.6 years (1.3 ttl pk-yrs)    Types: Cigarettes    Start date:     Quit date: 2023    Years since quittin.9   Smokeless Tobacco Never     Family History   Problem Relation Age of Onset    Ulcers Mother     Uterine cancer Mother     GI problems Mother     Atrial fibrillation Mother     Hypertension Father     Diabetes Father     Colon polyps Brother     Cancer Brother         Pre cancerous polyps    Colon cancer Maternal Grandmother         Colon cancer    Cancer Maternal Grandmother         Colon cancer    Alzheimer's disease Maternal Grandfather     Breast cancer Paternal Grandmother     No Known Problems Paternal Grandfather     Colon polyps Cousin     Atrial fibrillation Sister 48    Cancer Cousin         Pre cancerous polyps       Meds/Allergies       Current Outpatient Medications:     DULoxetine (CYMBALTA) 60 mg delayed  "release capsule    metroNIDAZOLE (METROGEL) 1 % gel    Sodium Fluoride 5000 PPM 1.1 % PSTE    Current Facility-Administered Medications:     lactated ringers infusion, 125 mL/hr, Intravenous, Continuous    No Known Allergies    Objective     /58   Pulse 72   Temp 98.3 °F (36.8 °C) (Temporal)   Resp 16   Ht 5' 10\" (1.778 m)   Wt 93.4 kg (206 lb)   SpO2 97%   BMI 29.56 kg/m²       PHYSICAL EXAM    Gen: NAD  Head: NCAT  CV: RRR  CHEST: Clear  ABD: soft, NT/ND  EXT: no edema      ASSESSMENT/PLAN:  This is a 43 y.o. year old male here for colonoscopy, and he is stable and optimized for his procedure.        "

## 2024-07-30 PROCEDURE — 88305 TISSUE EXAM BY PATHOLOGIST: CPT | Performed by: PATHOLOGY

## 2024-08-27 NOTE — TELEPHONE ENCOUNTER
Continue secondary prevention     Patient called to ask if he can have APAP ordered without actually being seen first  Advised this is not an option   He asked to be placed on wait list for consult, advised him he currently is on list

## 2024-10-02 ENCOUNTER — TELEPHONE (OUTPATIENT)
Dept: FAMILY MEDICINE CLINIC | Facility: CLINIC | Age: 44
End: 2024-10-02

## 2024-10-04 ENCOUNTER — TELEMEDICINE (OUTPATIENT)
Dept: FAMILY MEDICINE CLINIC | Facility: CLINIC | Age: 44
End: 2024-10-04
Payer: COMMERCIAL

## 2024-10-04 DIAGNOSIS — G47.33 OSA ON CPAP: Primary | ICD-10-CM

## 2024-10-04 PROCEDURE — 99213 OFFICE O/P EST LOW 20 MIN: CPT | Performed by: FAMILY MEDICINE

## 2024-10-04 NOTE — PROGRESS NOTES
Virtual Regular Visit  Name: Brody Morrissey      : 1980      MRN: [id number]  Encounter Provider: Abimbola Tinajero DO  Encounter Date: 10/4/2024   Encounter department: [facility]    Verification of patient location:    Patient is located at Home in the following state in which I hold an active license [place]    Assessment & Plan  DUTCH on CPAP  Pt doing well on auto-pap  Form for supplies completed  Pt continues to derive benefit from cpap for treatment of his DUTCH and should continue same                Encounter provider Abimbola Tinajero DO    The patient was identified by name and date of birth. Brody Morrissey was informed that this is a telemedicine visit and that the visit is being conducted through the Epic Embedded platform. He agrees to proceed..  My office door was closed. No one else was in the room.  He acknowledged consent and understanding of privacy and security of the video platform. The patient has agreed to participate and understands they can discontinue the visit at any time.    Patient is aware this is a billable service.     History of Present Illness     HPI  Pt presents to discuss his DUTCH.  He has been using cpap regularly and requires supplies.  Pt feels well on therapy.  Waking refreshed.  No chest pain, shortness of breath.  Blood pressure is appropriate.  Last sleep study documenting dutch 10/04/19          Review of Systems  See hpi; all other systems negative        Objective     There were no vitals taken for this visit.  Physical Exam  Constitutional:       Appearance: Normal appearance.   HENT:      Head: Normocephalic and atraumatic.      Mouth/Throat:      Mouth: Mucous membranes are moist.   Eyes:      Extraocular Movements: Extraocular movements intact.      Conjunctiva/sclera: Conjunctivae normal.   Pulmonary:      Effort: Pulmonary effort is normal. No respiratory distress.   Musculoskeletal:      Cervical back: Normal range of motion.   Neurological:       General: No focal deficit present.      Mental Status: He is alert and oriented to person, place, and time.         Visit Time  Total Visit Duration: 7

## 2024-11-13 DIAGNOSIS — F41.1 GENERALIZED ANXIETY DISORDER: ICD-10-CM

## 2024-11-13 RX ORDER — DULOXETIN HYDROCHLORIDE 60 MG/1
60 CAPSULE, DELAYED RELEASE ORAL DAILY
Qty: 90 CAPSULE | Refills: 1 | Status: SHIPPED | OUTPATIENT
Start: 2024-11-13

## 2024-11-22 ENCOUNTER — APPOINTMENT (OUTPATIENT)
Dept: RADIOLOGY | Age: 44
End: 2024-11-22
Payer: COMMERCIAL

## 2024-11-22 VITALS
BODY MASS INDEX: 29.2 KG/M2 | DIASTOLIC BLOOD PRESSURE: 73 MMHG | WEIGHT: 204 LBS | HEIGHT: 70 IN | HEART RATE: 90 BPM | SYSTOLIC BLOOD PRESSURE: 105 MMHG

## 2024-11-22 DIAGNOSIS — M25.521 PAIN IN RIGHT ELBOW: ICD-10-CM

## 2024-11-22 DIAGNOSIS — M77.11 LATERAL EPICONDYLITIS OF RIGHT ELBOW: Primary | ICD-10-CM

## 2024-11-22 PROCEDURE — 73080 X-RAY EXAM OF ELBOW: CPT

## 2024-11-22 PROCEDURE — 99213 OFFICE O/P EST LOW 20 MIN: CPT | Performed by: ORTHOPAEDIC SURGERY

## 2024-11-22 RX ORDER — MELOXICAM 15 MG/1
15 TABLET ORAL DAILY
Qty: 30 TABLET | Refills: 0 | Status: SHIPPED | OUTPATIENT
Start: 2024-11-22

## 2024-11-22 NOTE — PROGRESS NOTES
CHIEF COMPLAIN/REASON FOR VISIT  Chief Complaint   Patient presents with    Right Elbow - Pain       HISTORY OF PRESENT ILLNESS  Brody Morrissey is a RHD 44 y.o. male who presents for evaluation of their right elbow.  He has been experiencing activity related pain to the lateral aspect of his elbow over the past 6 weeks.  He does believe that began after pitching with his son.  He states that he also experienced an incident helping pull a heavy freezer off of another individual this did result in a contusion type injury to his forearm which resulted in bruising.  He states that reaching and grasping activities will exacerbate his pain.  He states that shoulder abduction exercises will also exacerbate pain while trying to hold the dumbbell.  He notices simple tasks such as holding his yeti cup to take a drink will also cause pain.  He denies any distal paresthesias.    REVIEW OF SYSTEMS  Review of systems was performed and, woutside that mentioned in the HPI, it was negative for symptomology related to the integumentary, hematologic, immunologic, allergic, neurologic, cardiovascular, respiratory, GI or  systems.     MEDICAL HISTORY  Patient Active Problem List   Diagnosis    Mixed hyperlipidemia    Generalized anxiety disorder    Family history of colonic polyps    SIADH (syndrome of inappropriate ADH production) (HCC)    DUTCH (obstructive sleep apnea)    Snoring    Tendinitis, de Quervain's    Hx of colonic polyps    Family hx of colon cancer       SURGICAL HISTORY  Past Surgical History:   Procedure Laterality Date    ME INCISION EXTENSOR TENDON SHEATH WRIST Left 6/16/2023    Procedure: RELEASE DEQUERVAINS, LEFT;  Surgeon: Mendez Seay MD;  Location: [facility] OR;  Service: Orthopedics       CURRENT MEDICATIONS    Current Outpatient Medications:     DULoxetine (CYMBALTA) 60 mg delayed release capsule, TAKE 1 CAPSULE BY MOUTH EVERY DAY, Disp: 90 capsule, Rfl: 1    meloxicam (Mobic) 15 mg tablet, Take 1 tablet (15 mg  total) by mouth daily, Disp: 30 tablet, Rfl: 0    metroNIDAZOLE (METROGEL) 1 % gel, Apply topically daily, Disp: 60 g, Rfl: 1    Sodium Fluoride 5000 PPM 1.1 % PSTE, Take 1 application. by mouth daily at bedtime, Disp: , Rfl:     SOCIAL HISTORY  Social History     Socioeconomic History    Marital status: /Civil Union     Spouse name: Not on file    Number of children: Not on file    Years of education: Not on file    Highest education level: Not on file   Occupational History    Not on file   Tobacco Use    Smoking status: Former     Current packs/day: 0.00     Average packs/day: 0.5 packs/day for 2.6 years (1.3 ttl pk-yrs)     Types: Cigarettes     Start date:      Quit date: 2023     Years since quittin.3    Smokeless tobacco: Never   Vaping Use    Vaping status: Never Used   Substance and Sexual Activity    Alcohol use: Not Currently     Comment: Last use 2024    Drug use: Not Currently     Types: Marijuana     Comment: Last use 2024    Sexual activity: Yes     Partners: Female     Birth control/protection: None   Other Topics Concern    Not on file   Social History Narrative    Not on file     Social Drivers of Health     Financial Resource Strain: Low Risk  (2023)    Overall Financial Resource Strain (CARDIA)     Difficulty of Paying Living Expenses: Not hard at all   Food Insecurity: No Food Insecurity (2023)    Hunger Vital Sign     Worried About Running Out of Food in the Last Year: Never true     Ran Out of Food in the Last Year: Never true   Transportation Needs: No Transportation Needs (2023)    PRAPARE - Transportation     Lack of Transportation (Medical): No     Lack of Transportation (Non-Medical): No   Physical Activity: Not on file   Stress: Not on file   Social Connections: Unknown (2024)    Received from [facility]    Social OrderUp     How often do you feel lonely or isolated from those around you? (Adult - for ages 18 years and over): Not on file  "  Intimate Partner Violence: Not on file   Housing Stability: Low Risk  (8/2/2023)    Housing Stability Vital Sign     Unable to Pay for Housing in the Last Year: No     Number of Times Moved in the Last Year: 1     Homeless in the Last Year: No       Objective     VITAL SIGNS  /73 (BP Location: Left arm, Patient Position: Sitting, Cuff Size: Large)   Pulse 90   Ht 5' 10\" (1.778 m) Comment: verbal  Wt 92.5 kg (204 lb)   BMI 29.27 kg/m²      PHYSICAL EXAM  General:   Well-appearing  No acute distress  Appears stated age    Cervical Spine  No tenderness to palpation over the cervical spine in the midline or of the paraspinal muscles  Full range of motion without pain  Negative spurlings   Negative Lhermitte test    Musculoskeletal: Right Elbow     Skin Intact    TTP lateral condyle              Instability Negative              ROM Full and painless in all planes   Ligamentously Stable   Compartments Soft/Compressible.   Sensation and motor function intact through radial/ulnar/median nerve distributions.               Radial pulse palpable     Ipsilateral shoulder, forearm, and hand demonstrate no obvious swelling, deformity, or other abnormality The patient has full ROM and stability these joints. There is no obvious tenderness to palpation throughout.      The contralateral upper extremity is negative for any tenderness to palpation. There is no deformity present. Patient is neurovascularly intact throughout.      RADIOGRAPHIC EXAMINATION/DIAGNOSTICS:  X-rays of the right elbow demonstrate no acute fracture or other osseous abnormalities.    ASSESSMENT/PLAN:  Right elbow lateral epicondylitis  Tenderness upon examination and review of the x-rays of the right elbow demonstrates painful symptoms associated with lateral epicondylitis of the right elbow.  I did discuss treatment options in the form of nighttime bracing with a cock up wrist splint.  He does have 1 from a prior issue with his wrist.  He may use " this as he sees fit.  In addition he could consider physical therapy.  He did defer this.  Oral analgesics such as meloxicam can be considered.  He was amenable to this.  50 mg meloxicam was prescribed to him today.  I did advise to avoid repetitive activities that will exacerbate his pain.  This can take several months to fully recover from.  Harpal verbalized understanding and had no further questions.  I will see him back on an as needed basis.        Scribe Attestation      I,:  Lavelle Willis am acting as a scribe while in the presence of the attending physician.:       I,:  Lyle Maldonado MD personally performed the services described in this documentation    as scribed in my presence.:

## 2024-11-24 ENCOUNTER — PATIENT MESSAGE (OUTPATIENT)
Dept: FAMILY MEDICINE CLINIC | Facility: CLINIC | Age: 44
End: 2024-11-24

## 2024-11-24 DIAGNOSIS — L73.9 FOLLICULITIS: Primary | ICD-10-CM

## 2024-11-26 RX ORDER — DOXYCYCLINE 100 MG/1
100 TABLET ORAL DAILY
Qty: 14 TABLET | Refills: 0 | Status: SHIPPED | OUTPATIENT
Start: 2024-11-26 | End: 2024-12-10

## 2024-12-11 ENCOUNTER — PATIENT MESSAGE (OUTPATIENT)
Dept: FAMILY MEDICINE CLINIC | Facility: CLINIC | Age: 44
End: 2024-12-11

## 2024-12-16 DIAGNOSIS — M25.521 PAIN IN RIGHT ELBOW: ICD-10-CM

## 2024-12-16 DIAGNOSIS — M77.11 LATERAL EPICONDYLITIS OF RIGHT ELBOW: ICD-10-CM

## 2024-12-17 ENCOUNTER — OFFICE VISIT (OUTPATIENT)
Dept: FAMILY MEDICINE CLINIC | Facility: CLINIC | Age: 44
End: 2024-12-17
Payer: COMMERCIAL

## 2024-12-17 VITALS
WEIGHT: 209.6 LBS | HEIGHT: 70 IN | TEMPERATURE: 97.7 F | HEART RATE: 70 BPM | RESPIRATION RATE: 18 BRPM | BODY MASS INDEX: 30.01 KG/M2 | DIASTOLIC BLOOD PRESSURE: 74 MMHG | OXYGEN SATURATION: 99 % | SYSTOLIC BLOOD PRESSURE: 112 MMHG

## 2024-12-17 DIAGNOSIS — L73.9 FOLLICULITIS: Primary | ICD-10-CM

## 2024-12-17 PROBLEM — E22.2 SIADH (SYNDROME OF INAPPROPRIATE ADH PRODUCTION) (HCC): Status: RESOLVED | Noted: 2019-11-01 | Resolved: 2024-12-17

## 2024-12-17 PROCEDURE — 99213 OFFICE O/P EST LOW 20 MIN: CPT | Performed by: FAMILY MEDICINE

## 2024-12-17 RX ORDER — SULFAMETHOXAZOLE AND TRIMETHOPRIM 800; 160 MG/1; MG/1
1 TABLET ORAL 2 TIMES DAILY
Qty: 14 TABLET | Refills: 0 | Status: SHIPPED | OUTPATIENT
Start: 2024-12-17 | End: 2024-12-24

## 2024-12-17 NOTE — PROGRESS NOTES
"Name: Brody Morrissey      : 1980      MRN: [id number]  Encounter Provider: Abimbola Tinajero DO  Encounter Date: 2024   Encounter department: [facility]  :  Assessment & Plan  Folliculitis  Start bactrim BID x 7 days  Benzoyl peroxide wash in shower   Use metrogel on affected area  Call if sx don't resolve    Orders:    sulfamethoxazole-trimethoprim (BACTRIM DS) 800-160 mg per tablet; Take 1 tablet by mouth 2 (two) times a day for 7 days    benzoyl peroxide 5 % external liquid; Apply topically 2 (two) times a day           History of Present Illness     HPI  Pt presents for folliculitis on the back of his scalp.  Used doxycycline x 2 weeks which has worked in the past, but this time it has done nothing.  Has raised, red, sore bumps.  Known rosacea    Review of Systems  See hpi    Objective   /74   Pulse 70   Temp 97.7 °F (36.5 °C) (Temporal)   Resp 18   Ht 5' 10\" (1.778 m)   Wt 95.1 kg (209 lb 9.6 oz)   SpO2 99%   BMI 30.07 kg/m²      Physical Exam  Constitutional:       Appearance: Normal appearance.   Eyes:      Extraocular Movements: Extraocular movements intact.      Conjunctiva/sclera: Conjunctivae normal.      Pupils: Pupils are equal, round, and reactive to light.   Skin:     Comments: Back of lower scalp with raised, erythematous lesions approx 1-2mm   Neurological:      General: No focal deficit present.      Mental Status: He is alert.   Psychiatric:         Mood and Affect: Mood normal.         Behavior: Behavior normal.         "

## 2024-12-18 RX ORDER — MELOXICAM 15 MG/1
15 TABLET ORAL DAILY
Qty: 30 TABLET | Refills: 0 | Status: SHIPPED | OUTPATIENT
Start: 2024-12-18

## 2025-01-11 DIAGNOSIS — M77.11 LATERAL EPICONDYLITIS OF RIGHT ELBOW: ICD-10-CM

## 2025-01-11 DIAGNOSIS — M25.521 PAIN IN RIGHT ELBOW: ICD-10-CM

## 2025-01-13 RX ORDER — MELOXICAM 15 MG/1
15 TABLET ORAL DAILY
Qty: 30 TABLET | Refills: 0 | Status: SHIPPED | OUTPATIENT
Start: 2025-01-13

## 2025-01-23 ENCOUNTER — OFFICE VISIT (OUTPATIENT)
Dept: OBGYN CLINIC | Facility: CLINIC | Age: 45
End: 2025-01-23
Payer: COMMERCIAL

## 2025-01-23 VITALS — WEIGHT: 206 LBS | BODY MASS INDEX: 29.49 KG/M2 | HEIGHT: 70 IN

## 2025-01-23 DIAGNOSIS — M77.11 LATERAL EPICONDYLITIS OF RIGHT ELBOW: ICD-10-CM

## 2025-01-23 DIAGNOSIS — M25.521 PAIN IN RIGHT ELBOW: Primary | ICD-10-CM

## 2025-01-23 PROCEDURE — 99213 OFFICE O/P EST LOW 20 MIN: CPT | Performed by: ORTHOPAEDIC SURGERY

## 2025-01-23 NOTE — PROGRESS NOTES
REASON FOR FOLLOW-UP  Brody Morrissey is a 44 y.o. male who presents for follow-up of the right Elbow    HISTORY OF PRESENT ILLNESS  Following our last visit, we decided to initially treat his Elbow symptoms non-operatively. Our plan was to manage their symptoms conservatively with brace and Mobic. Today, patient comes in for further evaluation.      PHYSICAL EXAM  Musculoskeletal: Right Elbow     Skin Intact    TTP None and lateral condyle              Instability Negative              ROM Limited secondary to pain   Ligamentously Stable   Compartments Soft/Compressible.   Sensation and motor function intact through radial/ulnar/median nerve distributions.               Radial pulse palpable     Ipsilateral shoulder, forearm, and hand demonstrate no obvious swelling, deformity, or other abnormality The patient has full ROM and stability these joints. There is no obvious tenderness to palpation throughout.      The contralateral upper extremity is negative for any tenderness to palpation. There is no deformity present. Patient is neurovascularly intact throughout.       DIAGNOSTIC IMAGING:  No new imaging today      IMPRESSION/REPORT/PLAN  Lateral epicondylitis  Biciptial tendonitis      Continue with Rest, NSAIDS, RICE  F/u in 6-8 weeks for MRI of right elbow if fails to improve.

## 2025-03-05 DIAGNOSIS — L71.9 ROSACEA: ICD-10-CM

## 2025-03-07 RX ORDER — METRONIDAZOLE 10 MG/G
GEL TOPICAL DAILY
Qty: 60 G | Refills: 3 | Status: SHIPPED | OUTPATIENT
Start: 2025-03-07

## 2025-03-24 ENCOUNTER — PATIENT MESSAGE (OUTPATIENT)
Dept: FAMILY MEDICINE CLINIC | Facility: CLINIC | Age: 45
End: 2025-03-24

## 2025-03-25 ENCOUNTER — OFFICE VISIT (OUTPATIENT)
Dept: FAMILY MEDICINE CLINIC | Facility: CLINIC | Age: 45
End: 2025-03-25
Payer: COMMERCIAL

## 2025-03-25 VITALS
SYSTOLIC BLOOD PRESSURE: 118 MMHG | HEART RATE: 63 BPM | RESPIRATION RATE: 16 BRPM | HEIGHT: 70 IN | TEMPERATURE: 98.7 F | OXYGEN SATURATION: 99 % | DIASTOLIC BLOOD PRESSURE: 68 MMHG | WEIGHT: 209.4 LBS | BODY MASS INDEX: 29.98 KG/M2

## 2025-03-25 DIAGNOSIS — H00.015 HORDEOLUM EXTERNUM OF LEFT LOWER EYELID: Primary | ICD-10-CM

## 2025-03-25 PROCEDURE — 99213 OFFICE O/P EST LOW 20 MIN: CPT | Performed by: FAMILY MEDICINE

## 2025-03-25 RX ORDER — TOBRAMYCIN 3 MG/ML
1 SOLUTION/ DROPS OPHTHALMIC 4 TIMES DAILY
Qty: 5 ML | Refills: 0 | Status: SHIPPED | OUTPATIENT
Start: 2025-03-25 | End: 2025-03-30

## 2025-03-25 NOTE — PATIENT INSTRUCTIONS
David (derm) [address], [place] [address]    Tobrex 2 drops in L eye four times daily and warm compresses if you can

## 2025-03-25 NOTE — PROGRESS NOTES
"Name: Brody Morrissey      : 1980      MRN: [id number]  Encounter Provider: Abimbola Tinajero DO  Encounter Date: 3/25/2025   Encounter department: [facility]  :  Assessment & Plan  Hordeolum externum of left lower eyelid  Warm compresses QID   Tobrex 2 drops QID x 5 days  Ophtho if this doesn't resolve  Orders:    tobramycin (TOBREX) 0.3 % SOLN; Administer 1 drop into the left eye 4 times a day for 5 days           History of Present Illness   HPI  Pt notes 2 days of L eye irritation, and pain when presses on lid.  Noticed bump in mirror.  Mild d/c.  No visual change    Review of Systems  See hpi  Objective   /68   Pulse 63   Temp 98.7 °F (37.1 °C) (Temporal)   Resp 16   Ht 5' 10\" (1.778 m)   Wt 95 kg (209 lb 6.4 oz)   SpO2 99%   BMI 30.05 kg/m²      Physical Exam  Constitutional:       Appearance: Normal appearance.   HENT:      Head: Normocephalic and atraumatic.   Eyes:      Conjunctiva/sclera: Conjunctivae normal.      Pupils: Pupils are equal, round, and reactive to light.      Comments: L lower lid with stye   Cardiovascular:      Rate and Rhythm: Normal rate and regular rhythm.      Heart sounds: No murmur heard.     No friction rub. No gallop.   Pulmonary:      Effort: Pulmonary effort is normal.      Breath sounds: Normal breath sounds. No wheezing, rhonchi or rales.   Neurological:      Mental Status: He is alert.         "

## 2025-06-09 DIAGNOSIS — M77.11 LATERAL EPICONDYLITIS OF RIGHT ELBOW: ICD-10-CM

## 2025-06-09 DIAGNOSIS — M25.521 PAIN IN RIGHT ELBOW: ICD-10-CM

## 2025-06-10 RX ORDER — MELOXICAM 15 MG/1
15 TABLET ORAL DAILY
Qty: 30 TABLET | Refills: 5 | Status: SHIPPED | OUTPATIENT
Start: 2025-06-10

## 2025-06-23 DIAGNOSIS — F41.1 GENERALIZED ANXIETY DISORDER: ICD-10-CM

## 2025-06-23 RX ORDER — DULOXETIN HYDROCHLORIDE 60 MG/1
60 CAPSULE, DELAYED RELEASE ORAL DAILY
Qty: 90 CAPSULE | Refills: 1 | Status: SHIPPED | OUTPATIENT
Start: 2025-06-23

## 2025-06-30 ENCOUNTER — PATIENT MESSAGE (OUTPATIENT)
Dept: FAMILY MEDICINE CLINIC | Facility: CLINIC | Age: 45
End: 2025-06-30

## 2025-08-05 ENCOUNTER — TELEPHONE (OUTPATIENT)
Dept: FAMILY MEDICINE CLINIC | Facility: CLINIC | Age: 45
End: 2025-08-05

## 2025-08-05 DIAGNOSIS — Z00.00 PHYSICAL EXAM, ANNUAL: Primary | ICD-10-CM

## (undated) DEVICE — GLOVE INDICATOR PI UNDERGLOVE SZ 7.5 BLUE

## (undated) DEVICE — INTENDED FOR TISSUE SEPARATION, AND OTHER PROCEDURES THAT REQUIRE A SHARP SURGICAL BLADE TO PUNCTURE OR CUT.: Brand: BARD-PARKER ® CARBON RIB-BACK BLADES

## (undated) DEVICE — ADHESIVE SKIN CLSR DERMABOND NX

## (undated) DEVICE — NEEDLE HYPO 22G X 1-1/2 IN

## (undated) DEVICE — GLOVE INDICATOR PI UNDERGLOVE SZ 8 BLUE

## (undated) DEVICE — STERILE BETHLEHEM PLASTIC HAND: Brand: CARDINAL HEALTH

## (undated) DEVICE — NEEDLE 18 G X 1 1/2

## (undated) DEVICE — GLOVE SRG BIOGEL 7.5

## (undated) DEVICE — SUT VICRYL 3-0 SH 27 IN J416H

## (undated) DEVICE — 3M™ STERI-STRIP™ REINFORCED ADHESIVE SKIN CLOSURES, R1547, 1/2 IN X 4 IN (12 MM X 100 MM), 6 STRIPS/ENVELOPE: Brand: 3M™ STERI-STRIP™

## (undated) DEVICE — CAST PADDING 4 IN UNSTERILE